# Patient Record
Sex: MALE | Race: WHITE | Employment: OTHER | URBAN - METROPOLITAN AREA
[De-identification: names, ages, dates, MRNs, and addresses within clinical notes are randomized per-mention and may not be internally consistent; named-entity substitution may affect disease eponyms.]

---

## 2017-04-04 ENCOUNTER — LAB ENCOUNTER (OUTPATIENT)
Dept: LAB | Age: 72
End: 2017-04-04
Attending: PHYSICAL MEDICINE & REHABILITATION
Payer: MEDICARE

## 2017-04-04 DIAGNOSIS — M21.372 LEFT FOOT DROP: ICD-10-CM

## 2017-04-04 DIAGNOSIS — Z79.891 ADMISSION FOR LONG-TERM OPIATE ANALGESIC USE: ICD-10-CM

## 2017-04-04 DIAGNOSIS — M48.061 SPINAL STENOSIS, LUMBAR REGION, WITHOUT NEUROGENIC CLAUDICATION: ICD-10-CM

## 2017-04-04 DIAGNOSIS — M51.37 DEGENERATION OF LUMBAR OR LUMBOSACRAL INTERVERTEBRAL DISC: ICD-10-CM

## 2017-04-04 DIAGNOSIS — Z98.890 PERSONAL HISTORY OF SURGERY TO HEART AND GREAT VESSELS, PRESENTING HAZARDS TO HEALTH: ICD-10-CM

## 2017-04-04 DIAGNOSIS — M50.30 DEGENERATION OF CERVICAL INTERVERTEBRAL DISC: ICD-10-CM

## 2017-04-04 DIAGNOSIS — M54.12 BRACHIAL NEURITIS: Primary | ICD-10-CM

## 2017-04-04 DIAGNOSIS — M21.371 RIGHT FOOT DROP: ICD-10-CM

## 2017-04-04 PROCEDURE — 80307 DRUG TEST PRSMV CHEM ANLYZR: CPT

## 2017-04-10 PROCEDURE — 80074 ACUTE HEPATITIS PANEL: CPT | Performed by: INTERNAL MEDICINE

## 2017-04-10 NOTE — PROGRESS NOTES
Quick Note:    Confirms hydrocodone and metabolite, consistant with his care.  No oxycodone detected.  ______

## 2017-05-11 PROBLEM — E11.9 DIABETES MELLITUS TYPE 2 WITHOUT RETINOPATHY (HCC): Status: ACTIVE | Noted: 2017-05-11

## 2017-05-11 PROBLEM — Z79.899 EYE EXAM DUE TO HIGH RISK MEDICATION, ENCOUNTER FOR: Status: ACTIVE | Noted: 2017-05-11

## 2017-05-11 PROBLEM — H25.813 COMBINED FORMS OF AGE-RELATED CATARACT OF BOTH EYES: Status: ACTIVE | Noted: 2017-05-11

## 2017-05-11 PROBLEM — M06.9 RHEUMATOID ARTHRITIS INVOLVING MULTIPLE SITES, UNSPECIFIED RHEUMATOID FACTOR PRESENCE: Status: ACTIVE | Noted: 2017-05-11

## 2017-06-12 PROBLEM — M54.2 CHRONIC NECK PAIN: Status: ACTIVE | Noted: 2017-06-12

## 2017-06-12 PROBLEM — G89.29 CHRONIC NECK PAIN: Status: ACTIVE | Noted: 2017-06-12

## 2017-07-03 PROBLEM — M05.79 RHEUMATOID ARTHRITIS INVOLVING MULTIPLE SITES WITH POSITIVE RHEUMATOID FACTOR (HCC): Status: ACTIVE | Noted: 2017-05-11

## 2017-10-04 PROCEDURE — 82570 ASSAY OF URINE CREATININE: CPT | Performed by: INTERNAL MEDICINE

## 2017-10-04 PROCEDURE — 82043 UR ALBUMIN QUANTITATIVE: CPT | Performed by: INTERNAL MEDICINE

## 2017-10-04 PROCEDURE — 81003 URINALYSIS AUTO W/O SCOPE: CPT | Performed by: INTERNAL MEDICINE

## 2017-10-16 PROCEDURE — 87086 URINE CULTURE/COLONY COUNT: CPT | Performed by: UROLOGY

## 2017-12-19 RX ORDER — DEXTROSE MONOHYDRATE 25 G/50ML
50 INJECTION, SOLUTION INTRAVENOUS
Status: CANCELLED | OUTPATIENT
Start: 2017-12-19

## 2017-12-19 RX ORDER — DOCUSATE SODIUM 100 MG/1
100 CAPSULE, LIQUID FILLED ORAL DAILY PRN
COMMUNITY
End: 2018-08-22 | Stop reason: ALTCHOICE

## 2017-12-21 ENCOUNTER — ANESTHESIA EVENT (OUTPATIENT)
Dept: SURGERY | Facility: HOSPITAL | Age: 72
End: 2017-12-21
Payer: MEDICARE

## 2018-01-09 ENCOUNTER — SURGERY (OUTPATIENT)
Age: 73
End: 2018-01-09

## 2018-01-09 ENCOUNTER — HOSPITAL ENCOUNTER (OUTPATIENT)
Facility: HOSPITAL | Age: 73
Setting detail: HOSPITAL OUTPATIENT SURGERY
Discharge: HOME | End: 2018-01-09
Attending: UROLOGY | Admitting: UROLOGY
Payer: MEDICARE

## 2018-01-09 ENCOUNTER — ANESTHESIA (OUTPATIENT)
Dept: SURGERY | Facility: HOSPITAL | Age: 73
End: 2018-01-09
Payer: MEDICARE

## 2018-01-09 VITALS
WEIGHT: 212.75 LBS | TEMPERATURE: 99 F | HEART RATE: 65 BPM | BODY MASS INDEX: 39.15 KG/M2 | RESPIRATION RATE: 20 BRPM | HEIGHT: 62 IN | DIASTOLIC BLOOD PRESSURE: 73 MMHG | SYSTOLIC BLOOD PRESSURE: 139 MMHG | OXYGEN SATURATION: 98 %

## 2018-01-09 DIAGNOSIS — N32.81 OVERACTIVE BLADDER: ICD-10-CM

## 2018-01-09 LAB
GLUCOSE BLD-MCNC: 102 MG/DL (ref 65–99)
GLUCOSE BLD-MCNC: 112 MG/DL (ref 65–99)

## 2018-01-09 PROCEDURE — 82962 GLUCOSE BLOOD TEST: CPT

## 2018-01-09 PROCEDURE — 3E0K8GC INTRODUCTION OF OTHER THERAPEUTIC SUBSTANCE INTO GENITOURINARY TRACT, VIA NATURAL OR ARTIFICIAL OPENING ENDOSCOPIC: ICD-10-PCS | Performed by: UROLOGY

## 2018-01-09 RX ORDER — ACETAMINOPHEN 500 MG
1000 TABLET ORAL ONCE AS NEEDED
Status: DISCONTINUED | OUTPATIENT
Start: 2018-01-09 | End: 2018-01-09

## 2018-01-09 RX ORDER — SODIUM CHLORIDE, SODIUM LACTATE, POTASSIUM CHLORIDE, CALCIUM CHLORIDE 600; 310; 30; 20 MG/100ML; MG/100ML; MG/100ML; MG/100ML
INJECTION, SOLUTION INTRAVENOUS CONTINUOUS
Status: DISCONTINUED | OUTPATIENT
Start: 2018-01-09 | End: 2018-01-09

## 2018-01-09 RX ORDER — DEXAMETHASONE SODIUM PHOSPHATE 4 MG/ML
4 VIAL (ML) INJECTION AS NEEDED
Status: DISCONTINUED | OUTPATIENT
Start: 2018-01-09 | End: 2018-01-09

## 2018-01-09 RX ORDER — HYDROMORPHONE HYDROCHLORIDE 1 MG/ML
0.4 INJECTION, SOLUTION INTRAMUSCULAR; INTRAVENOUS; SUBCUTANEOUS EVERY 5 MIN PRN
Status: DISCONTINUED | OUTPATIENT
Start: 2018-01-09 | End: 2018-01-09

## 2018-01-09 RX ORDER — LIDOCAINE HYDROCHLORIDE 20 MG/ML
JELLY TOPICAL AS NEEDED
Status: DISCONTINUED | OUTPATIENT
Start: 2018-01-09 | End: 2018-01-09

## 2018-01-09 RX ORDER — MIDAZOLAM HYDROCHLORIDE 1 MG/ML
1 INJECTION INTRAMUSCULAR; INTRAVENOUS EVERY 5 MIN PRN
Status: DISCONTINUED | OUTPATIENT
Start: 2018-01-09 | End: 2018-01-09

## 2018-01-09 RX ORDER — CEFAZOLIN SODIUM 1 G/3ML
INJECTION, POWDER, FOR SOLUTION INTRAMUSCULAR; INTRAVENOUS
Status: DISCONTINUED | OUTPATIENT
Start: 2018-01-09 | End: 2018-01-09 | Stop reason: HOSPADM

## 2018-01-09 RX ORDER — DEXTROSE MONOHYDRATE 25 G/50ML
50 INJECTION, SOLUTION INTRAVENOUS
Status: DISCONTINUED | OUTPATIENT
Start: 2018-01-09 | End: 2018-01-09

## 2018-01-09 RX ORDER — METOCLOPRAMIDE HYDROCHLORIDE 5 MG/ML
10 INJECTION INTRAMUSCULAR; INTRAVENOUS AS NEEDED
Status: DISCONTINUED | OUTPATIENT
Start: 2018-01-09 | End: 2018-01-09

## 2018-01-09 RX ORDER — ONDANSETRON 2 MG/ML
4 INJECTION INTRAMUSCULAR; INTRAVENOUS AS NEEDED
Status: DISCONTINUED | OUTPATIENT
Start: 2018-01-09 | End: 2018-01-09

## 2018-01-09 RX ORDER — LABETALOL HYDROCHLORIDE 5 MG/ML
5 INJECTION, SOLUTION INTRAVENOUS EVERY 5 MIN PRN
Status: DISCONTINUED | OUTPATIENT
Start: 2018-01-09 | End: 2018-01-09

## 2018-01-09 RX ORDER — INSULIN ASPART 100 [IU]/ML
INJECTION, SOLUTION INTRAVENOUS; SUBCUTANEOUS ONCE
Status: DISCONTINUED | OUTPATIENT
Start: 2018-01-09 | End: 2018-01-09

## 2018-01-09 RX ORDER — CEFAZOLIN SODIUM/WATER 2 G/20 ML
2 SYRINGE (ML) INTRAVENOUS ONCE
Status: DISCONTINUED | OUTPATIENT
Start: 2018-01-09 | End: 2018-01-09 | Stop reason: HOSPADM

## 2018-01-09 RX ORDER — NALOXONE HYDROCHLORIDE 0.4 MG/ML
80 INJECTION, SOLUTION INTRAMUSCULAR; INTRAVENOUS; SUBCUTANEOUS AS NEEDED
Status: DISCONTINUED | OUTPATIENT
Start: 2018-01-09 | End: 2018-01-09

## 2018-01-09 NOTE — ANESTHESIA POSTPROCEDURE EVALUATION
[facility] [facility]  Patient Status:  Hospital Outpatient Surgery   Age/Gender 67year old male MRN [id number]   Location [address] [facility] Nyla Wiliam, 1604 [facility] Day # 0 PCP Cesario Samson

## 2018-01-09 NOTE — OPERATIVE REPORT
[facility]  Date of service: 1/9/2018    Rick Shah  [id number]    PREOPERATIVE DIAGNOSIS: Refractory urinary urgency and frequency. POSTOPERATIVE DIAGNOSIS: Refractory urinary urgency and frequency.    PROCEDURE PERFORMED: Cystoscopy with i transferred to the postanesthesia care unit in stable condition with no immediately apparent complications. We will ensure that the patient is able to void adequately prior to going home. He will call us with any difficulty voiding or any signs of urinary t

## 2018-01-09 NOTE — H&P
H&P dated 12/13/17 by Dr. Ozell Goldmann was reviewed. No changes to be made. Patient seen in preoperative area. We discussed the surgical plan for today and again discussed risks, benefits, alternatives, and post operative expectations.     Patient Wilmar Otero

## 2018-01-09 NOTE — ANESTHESIA PREPROCEDURE EVALUATION
PRE-OP EVALUATION    Patient Name: Lucas Mccann.    Pre-op Diagnosis: Overactive bladder [N32.81]    Procedure(s):  CYSTOSCOPY, INTRAVESICAL BOTULINUM TOXIN INJECTION    Surgeon(s) and Role:     Liz Concepcion, DO - Primary    Pre-op vitals re known allergies. Anesthesia Evaluation    Patient summary reviewed. Anesthetic Complications           GI/Hepatic/Renal      (+) GERD                           Cardiovascular      ECG reviewed.           (+) obesity  (+) hypertension   (+) hyperlipi 11/10/2017   K 4.4 11/10/2017    11/10/2017   CO2 31.7 11/10/2017   BUN 8 11/10/2017   CREATSERUM 0.64 (L) 11/10/2017    (H) 11/10/2017            Airway      Mallampati: III       Cardiovascular    Cardiovascular exam normal.         Dental

## 2018-02-08 PROBLEM — M48.02 CERVICAL STENOSIS OF SPINAL CANAL: Status: ACTIVE | Noted: 2018-02-08

## 2018-02-15 ENCOUNTER — HOSPITAL (OUTPATIENT)
Dept: OTHER | Age: 73
End: 2018-02-15
Attending: ORTHOPAEDIC SURGERY

## 2018-02-15 LAB
ANALYZER ANC (IANC): ABNORMAL
ERYTHROCYTE [DISTWIDTH] IN BLOOD: 13.2 % (ref 11–15)
GLUCOSE BLDC GLUCOMTR-MCNC: 103 MG/DL (ref 65–99)
GLUCOSE BLDC GLUCOMTR-MCNC: 123 MG/DL (ref 65–99)
HEMATOCRIT: 40.4 % (ref 39–51)
HGB BLD-MCNC: 12.9 GM/DL (ref 13–17)
INR PPP: 1.1
MCH RBC QN AUTO: 29.1 PG (ref 26–34)
MCHC RBC AUTO-ENTMCNC: 31.9 GM/DL (ref 32–36.5)
MCV RBC AUTO: 91.2 FL (ref 78–100)
PLATELET # BLD: 197 THOUSAND/MCL (ref 140–450)
PROTHROMBIN TIME: 11.2 SECONDS (ref 9.7–11.8)
PROTHROMBIN TIME: NORMAL
RBC # BLD: 4.43 MILLION/MCL (ref 4.5–5.9)
WBC # BLD: 8.5 THOUSAND/MCL (ref 4.2–11)

## 2018-03-06 ENCOUNTER — HOSPITAL (OUTPATIENT)
Dept: OTHER | Age: 73
End: 2018-03-06
Attending: ORTHOPAEDIC SURGERY

## 2018-03-06 ENCOUNTER — CHARTING TRANS (OUTPATIENT)
Dept: OTHER | Age: 73
End: 2018-03-06

## 2018-03-06 LAB
GLUCOSE BLDC GLUCOMTR-MCNC: 104 MG/DL (ref 65–99)
GLUCOSE BLDC GLUCOMTR-MCNC: 114 MG/DL (ref 65–99)
GLUCOSE BLDC GLUCOMTR-MCNC: 172 MG/DL (ref 65–99)
GLUCOSE BLDC GLUCOMTR-MCNC: 241 MG/DL (ref 65–99)

## 2018-03-07 LAB
GLUCOSE BLDC GLUCOMTR-MCNC: 173 MG/DL (ref 65–99)
GLUCOSE BLDC GLUCOMTR-MCNC: 177 MG/DL (ref 65–99)
GLUCOSE BLDC GLUCOMTR-MCNC: 180 MG/DL (ref 65–99)

## 2018-03-12 PROBLEM — Z98.1 S/P CERVICAL SPINAL FUSION: Status: ACTIVE | Noted: 2018-03-12

## 2018-05-31 PROBLEM — R53.1 WEAKNESS: Status: ACTIVE | Noted: 2018-05-31

## 2018-08-16 PROBLEM — M19.012 GLENOHUMERAL ARTHRITIS, LEFT: Status: ACTIVE | Noted: 2018-08-16

## 2018-08-22 PROBLEM — M75.102 LEFT ROTATOR CUFF TEAR ARTHROPATHY: Status: ACTIVE | Noted: 2018-08-22

## 2018-08-22 PROBLEM — M12.812 LEFT ROTATOR CUFF TEAR ARTHROPATHY: Status: ACTIVE | Noted: 2018-08-22

## 2018-10-09 PROBLEM — J30.1 SEASONAL ALLERGIC RHINITIS DUE TO POLLEN: Status: ACTIVE | Noted: 2018-10-09

## 2018-10-09 PROCEDURE — 82570 ASSAY OF URINE CREATININE: CPT | Performed by: INTERNAL MEDICINE

## 2018-10-09 PROCEDURE — 82043 UR ALBUMIN QUANTITATIVE: CPT | Performed by: INTERNAL MEDICINE

## 2019-01-08 PROCEDURE — 87086 URINE CULTURE/COLONY COUNT: CPT | Performed by: INTERNAL MEDICINE

## 2019-01-08 PROCEDURE — 81003 URINALYSIS AUTO W/O SCOPE: CPT | Performed by: INTERNAL MEDICINE

## 2019-02-12 PROBLEM — Z79.899 MEDICAL CANNABIS USE: Status: ACTIVE | Noted: 2019-02-12

## 2019-02-15 ENCOUNTER — ANESTHESIA EVENT (OUTPATIENT)
Dept: SURGERY | Facility: HOSPITAL | Age: 74
End: 2019-02-15

## 2019-02-26 ENCOUNTER — HOSPITAL ENCOUNTER (OUTPATIENT)
Facility: HOSPITAL | Age: 74
Setting detail: HOSPITAL OUTPATIENT SURGERY
Discharge: HOME | DRG: 699 | End: 2019-02-26
Attending: UROLOGY | Admitting: UROLOGY
Payer: MEDICARE

## 2019-02-26 ENCOUNTER — ANESTHESIA (OUTPATIENT)
Dept: SURGERY | Facility: HOSPITAL | Age: 74
End: 2019-02-26

## 2019-02-26 VITALS
OXYGEN SATURATION: 95 % | RESPIRATION RATE: 14 BRPM | SYSTOLIC BLOOD PRESSURE: 121 MMHG | HEIGHT: 62 IN | BODY MASS INDEX: 39.23 KG/M2 | TEMPERATURE: 98 F | DIASTOLIC BLOOD PRESSURE: 63 MMHG | WEIGHT: 213.19 LBS | HEART RATE: 83 BPM

## 2019-02-26 DIAGNOSIS — N32.81 OAB (OVERACTIVE BLADDER): ICD-10-CM

## 2019-02-26 LAB — GLUCOSE BLD-MCNC: 92 MG/DL (ref 70–99)

## 2019-02-26 PROCEDURE — 82962 GLUCOSE BLOOD TEST: CPT

## 2019-02-26 PROCEDURE — 3E0K8GC INTRODUCTION OF OTHER THERAPEUTIC SUBSTANCE INTO GENITOURINARY TRACT, VIA NATURAL OR ARTIFICIAL OPENING ENDOSCOPIC: ICD-10-PCS | Performed by: UROLOGY

## 2019-02-26 RX ORDER — CEFAZOLIN SODIUM/WATER 2 G/20 ML
2 SYRINGE (ML) INTRAVENOUS ONCE
Status: DISCONTINUED | OUTPATIENT
Start: 2019-02-26 | End: 2019-02-26 | Stop reason: HOSPADM

## 2019-02-26 RX ORDER — ONDANSETRON 2 MG/ML
4 INJECTION INTRAMUSCULAR; INTRAVENOUS AS NEEDED
Status: DISCONTINUED | OUTPATIENT
Start: 2019-02-26 | End: 2019-02-26

## 2019-02-26 RX ORDER — DEXTROSE MONOHYDRATE 25 G/50ML
50 INJECTION, SOLUTION INTRAVENOUS
Status: DISCONTINUED | OUTPATIENT
Start: 2019-02-26 | End: 2019-02-26 | Stop reason: HOSPADM

## 2019-02-26 RX ORDER — NALOXONE HYDROCHLORIDE 0.4 MG/ML
80 INJECTION, SOLUTION INTRAMUSCULAR; INTRAVENOUS; SUBCUTANEOUS AS NEEDED
Status: DISCONTINUED | OUTPATIENT
Start: 2019-02-26 | End: 2019-02-26

## 2019-02-26 RX ORDER — PHENAZOPYRIDINE HYDROCHLORIDE 200 MG/1
200 TABLET, FILM COATED ORAL 3 TIMES DAILY PRN
Qty: 21 TABLET | Refills: 0 | Status: SHIPPED | OUTPATIENT
Start: 2019-02-26 | End: 2019-04-10

## 2019-02-26 RX ORDER — SODIUM CHLORIDE, SODIUM LACTATE, POTASSIUM CHLORIDE, CALCIUM CHLORIDE 600; 310; 30; 20 MG/100ML; MG/100ML; MG/100ML; MG/100ML
INJECTION, SOLUTION INTRAVENOUS CONTINUOUS
Status: DISCONTINUED | OUTPATIENT
Start: 2019-02-26 | End: 2019-02-26

## 2019-02-26 RX ORDER — DIPHENHYDRAMINE HYDROCHLORIDE 50 MG/ML
12.5 INJECTION INTRAMUSCULAR; INTRAVENOUS AS NEEDED
Status: DISCONTINUED | OUTPATIENT
Start: 2019-02-26 | End: 2019-02-26

## 2019-02-26 RX ORDER — LABETALOL HYDROCHLORIDE 5 MG/ML
5 INJECTION, SOLUTION INTRAVENOUS EVERY 5 MIN PRN
Status: DISCONTINUED | OUTPATIENT
Start: 2019-02-26 | End: 2019-02-26

## 2019-02-26 RX ORDER — CEFAZOLIN SODIUM/WATER 2 G/20 ML
SYRINGE (ML) INTRAVENOUS
Status: DISCONTINUED
Start: 2019-02-26 | End: 2019-02-26

## 2019-02-26 RX ORDER — METOCLOPRAMIDE HYDROCHLORIDE 5 MG/ML
10 INJECTION INTRAMUSCULAR; INTRAVENOUS AS NEEDED
Status: DISCONTINUED | OUTPATIENT
Start: 2019-02-26 | End: 2019-02-26

## 2019-02-26 RX ORDER — FLUTICASONE PROPIONATE 50 MCG
2 SPRAY, SUSPENSION (ML) NASAL DAILY
Refills: 11 | COMMUNITY
Start: 2019-01-10 | End: 2019-05-16

## 2019-02-26 RX ORDER — ACETAMINOPHEN 500 MG
1000 TABLET ORAL ONCE
Status: DISCONTINUED | OUTPATIENT
Start: 2019-02-26 | End: 2019-02-26

## 2019-02-26 RX ORDER — LIDOCAINE HYDROCHLORIDE 20 MG/ML
JELLY TOPICAL AS NEEDED
Status: DISCONTINUED | OUTPATIENT
Start: 2019-02-26 | End: 2019-02-26 | Stop reason: HOSPADM

## 2019-02-26 RX ORDER — GLIMEPIRIDE 4 MG/1
4 TABLET ORAL
COMMUNITY
End: 2019-05-10

## 2019-02-26 RX ORDER — DEXTROSE MONOHYDRATE 25 G/50ML
50 INJECTION, SOLUTION INTRAVENOUS
Status: DISCONTINUED | OUTPATIENT
Start: 2019-02-26 | End: 2019-02-26

## 2019-02-26 NOTE — ANESTHESIA PREPROCEDURE EVALUATION
PRE-OP EVALUATION    Patient Name: Anisha Banda.    Pre-op Diagnosis: OAB (overactive bladder) [N32.81]    Procedure(s):  CYSTOSCOPY BOTOX INJECTION 100UNITS    Surgeon(s) and Role:     Jenifer Salvador, DO - Primary    Pre-op vitals reviewed. [START ON 4/19/2019] morphINE Sulfate ER (MS CONTIN) 15 MG Oral Tab CR Take 1 tablet (15 mg total) by mouth every 8 (eight) hours.  Disp: 90 tablet Rfl: 0   [START ON 4/19/2019] OxyCODONE HCl 5 MG Oral Tab Take 1 tablet (5 mg total) by mouth every 8 (eight) Comment: pulmonary fibrosis                  (+) sleep apnea       Neuro/Psych                                    Past Surgical History:   Procedure Laterality Date   • ARTHROSCOPY OF JOINT UNLISTED Right 2002    RTC repair   • BACK SURGERY  2001   • CARPA TM distance: < 4 cm  Neck ROM: limited Cardiovascular    Cardiovascular exam normal.  Rhythm: regular  Rate: normal  (-) murmur   Dental             Pulmonary    Pulmonary exam normal.  Breath sounds clear to auscultation bilaterally.                Other f

## 2019-02-26 NOTE — OPERATIVE REPORT
[facility]  Date of service: 2/26/2019    Anny Bryant  [id number]    PREOPERATIVE DIAGNOSIS: OAB with refractory urinary urgency and frequency  POSTOPERATIVE DIAGNOSIS: OAB with refractory urinary urgency and frequency  PROCEDURE PERFORMED: Cy transferred to the postanesthesia care unit in stable condition with no immediately apparent complications. We will ensure that the patient is able to void adequately prior to going home. He will call us with any difficulty voiding or any signs of urinary t

## 2019-02-26 NOTE — ANESTHESIA POSTPROCEDURE EVALUATION
929 [facility] Patient Status:  Surgery Admit   Age/Gender 75year old male MRN [id number]   Location [address] [place] [address] Attending Suma Crowley, [address] [facility] Day # 0 PCP No primary care provider on file.

## 2019-02-27 LAB — GLUCOSE BLD-MCNC: 120 MG/DL (ref 70–99)

## 2019-04-26 ENCOUNTER — ANESTHESIA EVENT (OUTPATIENT)
Dept: SURGERY | Facility: HOSPITAL | Age: 74
DRG: 483 | End: 2019-04-26
Payer: MEDICARE

## 2019-04-26 ENCOUNTER — ANESTHESIA (OUTPATIENT)
Dept: SURGERY | Facility: HOSPITAL | Age: 74
DRG: 483 | End: 2019-04-26
Payer: MEDICARE

## 2019-04-26 ENCOUNTER — APPOINTMENT (OUTPATIENT)
Dept: GENERAL RADIOLOGY | Facility: HOSPITAL | Age: 74
DRG: 483 | End: 2019-04-26
Attending: ORTHOPAEDIC SURGERY
Payer: MEDICARE

## 2019-04-26 ENCOUNTER — HOSPITAL ENCOUNTER (INPATIENT)
Facility: HOSPITAL | Age: 74
LOS: 1 days | Discharge: HOME HEALTH | DRG: 483 | End: 2019-04-27
Attending: ORTHOPAEDIC SURGERY | Admitting: ORTHOPAEDIC SURGERY
Payer: MEDICARE

## 2019-04-26 DIAGNOSIS — M05.79 SEROPOSITIVE RHEUMATOID ARTHRITIS OF MULTIPLE SITES (HCC): ICD-10-CM

## 2019-04-26 DIAGNOSIS — M75.102 ROTATOR CUFF SYNDROME OF LEFT SHOULDER: ICD-10-CM

## 2019-04-26 PROCEDURE — 3E0T3BZ INTRODUCTION OF ANESTHETIC AGENT INTO PERIPHERAL NERVES AND PLEXI, PERCUTANEOUS APPROACH: ICD-10-PCS | Performed by: ANESTHESIOLOGY

## 2019-04-26 PROCEDURE — 88305 TISSUE EXAM BY PATHOLOGIST: CPT | Performed by: ORTHOPAEDIC SURGERY

## 2019-04-26 PROCEDURE — 82962 GLUCOSE BLOOD TEST: CPT

## 2019-04-26 PROCEDURE — 88311 DECALCIFY TISSUE: CPT | Performed by: ORTHOPAEDIC SURGERY

## 2019-04-26 PROCEDURE — 0RRK00Z REPLACEMENT OF LEFT SHOULDER JOINT WITH REVERSE BALL AND SOCKET SYNTHETIC SUBSTITUTE, OPEN APPROACH: ICD-10-PCS | Performed by: ORTHOPAEDIC SURGERY

## 2019-04-26 PROCEDURE — 64415 NJX AA&/STRD BRCH PLXS IMG: CPT | Performed by: ORTHOPAEDIC SURGERY

## 2019-04-26 PROCEDURE — 73030 X-RAY EXAM OF SHOULDER: CPT | Performed by: ORTHOPAEDIC SURGERY

## 2019-04-26 PROCEDURE — 76942 ECHO GUIDE FOR BIOPSY: CPT | Performed by: ORTHOPAEDIC SURGERY

## 2019-04-26 PROCEDURE — 99152 MOD SED SAME PHYS/QHP 5/>YRS: CPT | Performed by: ORTHOPAEDIC SURGERY

## 2019-04-26 DEVICE — IMPLANTABLE DEVICE
Type: IMPLANTABLE DEVICE | Site: SHOULDER | Status: FUNCTIONAL
Brand: AEQUALIS™ ASCEND™ FLEX

## 2019-04-26 DEVICE — IMPLANTABLE DEVICE
Type: IMPLANTABLE DEVICE | Site: SHOULDER | Status: FUNCTIONAL
Brand: FLEX SHOULDER SYSTEM

## 2019-04-26 RX ORDER — FLUTICASONE PROPIONATE 50 MCG
2 SPRAY, SUSPENSION (ML) NASAL DAILY
Status: DISCONTINUED | OUTPATIENT
Start: 2019-04-26 | End: 2019-04-27

## 2019-04-26 RX ORDER — HYDROCODONE BITARTRATE AND ACETAMINOPHEN 10; 325 MG/1; MG/1
1-2 TABLET ORAL EVERY 6 HOURS PRN
Qty: 40 TABLET | Refills: 0 | Status: SHIPPED | OUTPATIENT
Start: 2019-04-26 | End: 2019-04-27

## 2019-04-26 RX ORDER — DEXTROSE MONOHYDRATE 25 G/50ML
50 INJECTION, SOLUTION INTRAVENOUS
Status: DISCONTINUED | OUTPATIENT
Start: 2019-04-26 | End: 2019-04-26 | Stop reason: HOSPADM

## 2019-04-26 RX ORDER — LIDOCAINE HYDROCHLORIDE 10 MG/ML
INJECTION, SOLUTION INFILTRATION; PERINEURAL
Status: COMPLETED | OUTPATIENT
Start: 2019-04-26 | End: 2019-04-26

## 2019-04-26 RX ORDER — SODIUM CHLORIDE 0.9 % (FLUSH) 0.9 %
10 SYRINGE (ML) INJECTION AS NEEDED
Status: DISCONTINUED | OUTPATIENT
Start: 2019-04-26 | End: 2019-04-27

## 2019-04-26 RX ORDER — CLINDAMYCIN PHOSPHATE 900 MG/50ML
900 INJECTION INTRAVENOUS ONCE
Status: COMPLETED | OUTPATIENT
Start: 2019-04-26 | End: 2019-04-26

## 2019-04-26 RX ORDER — MORPHINE SULFATE 2 MG/ML
2 INJECTION, SOLUTION INTRAMUSCULAR; INTRAVENOUS EVERY 2 HOUR PRN
Status: DISCONTINUED | OUTPATIENT
Start: 2019-04-27 | End: 2019-04-27

## 2019-04-26 RX ORDER — CEFAZOLIN SODIUM/WATER 2 G/20 ML
2 SYRINGE (ML) INTRAVENOUS EVERY 8 HOURS
Status: COMPLETED | OUTPATIENT
Start: 2019-04-26 | End: 2019-04-26

## 2019-04-26 RX ORDER — MORPHINE SULFATE 4 MG/ML
4 INJECTION, SOLUTION INTRAMUSCULAR; INTRAVENOUS EVERY 10 MIN PRN
Status: DISCONTINUED | OUTPATIENT
Start: 2019-04-26 | End: 2019-04-26 | Stop reason: HOSPADM

## 2019-04-26 RX ORDER — MORPHINE SULFATE 4 MG/ML
4 INJECTION, SOLUTION INTRAMUSCULAR; INTRAVENOUS EVERY 2 HOUR PRN
Status: DISCONTINUED | OUTPATIENT
Start: 2019-04-27 | End: 2019-04-27

## 2019-04-26 RX ORDER — MORPHINE SULFATE 2 MG/ML
2 INJECTION, SOLUTION INTRAMUSCULAR; INTRAVENOUS EVERY 10 MIN PRN
Status: DISCONTINUED | OUTPATIENT
Start: 2019-04-26 | End: 2019-04-26 | Stop reason: HOSPADM

## 2019-04-26 RX ORDER — BISACODYL 10 MG
10 SUPPOSITORY, RECTAL RECTAL
Status: DISCONTINUED | OUTPATIENT
Start: 2019-04-26 | End: 2019-04-27

## 2019-04-26 RX ORDER — ONDANSETRON 2 MG/ML
4 INJECTION INTRAMUSCULAR; INTRAVENOUS ONCE AS NEEDED
Status: DISCONTINUED | OUTPATIENT
Start: 2019-04-26 | End: 2019-04-26 | Stop reason: HOSPADM

## 2019-04-26 RX ORDER — HYDROCODONE BITARTRATE AND ACETAMINOPHEN 10; 325 MG/1; MG/1
2 TABLET ORAL EVERY 6 HOURS PRN
Status: DISCONTINUED | OUTPATIENT
Start: 2019-04-27 | End: 2019-04-27

## 2019-04-26 RX ORDER — OXYCODONE HCL 10 MG/1
10 TABLET, FILM COATED, EXTENDED RELEASE ORAL EVERY 12 HOURS
Status: DISCONTINUED | OUTPATIENT
Start: 2019-04-26 | End: 2019-04-27

## 2019-04-26 RX ORDER — MORPHINE SULFATE 4 MG/ML
6 INJECTION, SOLUTION INTRAMUSCULAR; INTRAVENOUS EVERY 2 HOUR PRN
Status: DISCONTINUED | OUTPATIENT
Start: 2019-04-26 | End: 2019-04-27

## 2019-04-26 RX ORDER — HYDROCODONE BITARTRATE AND ACETAMINOPHEN 10; 325 MG/1; MG/1
1 TABLET ORAL EVERY 6 HOURS PRN
Status: DISCONTINUED | OUTPATIENT
Start: 2019-04-27 | End: 2019-04-27

## 2019-04-26 RX ORDER — SENNOSIDES 8.6 MG
17.2 TABLET ORAL NIGHTLY
Status: DISCONTINUED | OUTPATIENT
Start: 2019-04-26 | End: 2019-04-27

## 2019-04-26 RX ORDER — ATORVASTATIN CALCIUM 10 MG/1
10 TABLET, FILM COATED ORAL NIGHTLY
Status: DISCONTINUED | OUTPATIENT
Start: 2019-04-26 | End: 2019-04-27

## 2019-04-26 RX ORDER — POLYETHYLENE GLYCOL 3350 17 G/17G
17 POWDER, FOR SOLUTION ORAL DAILY PRN
Status: DISCONTINUED | OUTPATIENT
Start: 2019-04-26 | End: 2019-04-27

## 2019-04-26 RX ORDER — MORPHINE SULFATE 10 MG/ML
6 INJECTION, SOLUTION INTRAMUSCULAR; INTRAVENOUS EVERY 10 MIN PRN
Status: DISCONTINUED | OUTPATIENT
Start: 2019-04-26 | End: 2019-04-26 | Stop reason: HOSPADM

## 2019-04-26 RX ORDER — OXYCODONE HYDROCHLORIDE 5 MG/1
10 TABLET ORAL EVERY 4 HOURS PRN
Status: DISCONTINUED | OUTPATIENT
Start: 2019-04-26 | End: 2019-04-27

## 2019-04-26 RX ORDER — NEOSTIGMINE METHYLSULFATE 0.5 MG/ML
INJECTION INTRAVENOUS AS NEEDED
Status: DISCONTINUED | OUTPATIENT
Start: 2019-04-26 | End: 2019-04-26 | Stop reason: SURG

## 2019-04-26 RX ORDER — NALOXONE HYDROCHLORIDE 0.4 MG/ML
80 INJECTION, SOLUTION INTRAMUSCULAR; INTRAVENOUS; SUBCUTANEOUS AS NEEDED
Status: DISCONTINUED | OUTPATIENT
Start: 2019-04-26 | End: 2019-04-26 | Stop reason: HOSPADM

## 2019-04-26 RX ORDER — ROCURONIUM BROMIDE 10 MG/ML
INJECTION, SOLUTION INTRAVENOUS AS NEEDED
Status: DISCONTINUED | OUTPATIENT
Start: 2019-04-26 | End: 2019-04-26 | Stop reason: SURG

## 2019-04-26 RX ORDER — SODIUM CHLORIDE, SODIUM LACTATE, POTASSIUM CHLORIDE, CALCIUM CHLORIDE 600; 310; 30; 20 MG/100ML; MG/100ML; MG/100ML; MG/100ML
INJECTION, SOLUTION INTRAVENOUS CONTINUOUS
Status: DISCONTINUED | OUTPATIENT
Start: 2019-04-26 | End: 2019-04-27

## 2019-04-26 RX ORDER — HYDROCODONE BITARTRATE AND ACETAMINOPHEN 5; 325 MG/1; MG/1
1 TABLET ORAL AS NEEDED
Status: DISCONTINUED | OUTPATIENT
Start: 2019-04-26 | End: 2019-04-26 | Stop reason: HOSPADM

## 2019-04-26 RX ORDER — PHENYLEPHRINE HCL 10 MG/ML
VIAL (ML) INJECTION AS NEEDED
Status: DISCONTINUED | OUTPATIENT
Start: 2019-04-26 | End: 2019-04-26 | Stop reason: SURG

## 2019-04-26 RX ORDER — OXYCODONE HYDROCHLORIDE 5 MG/1
5 TABLET ORAL EVERY 4 HOURS PRN
Status: DISCONTINUED | OUTPATIENT
Start: 2019-04-26 | End: 2019-04-27

## 2019-04-26 RX ORDER — DEXTROSE MONOHYDRATE 25 G/50ML
50 INJECTION, SOLUTION INTRAVENOUS AS NEEDED
Status: DISCONTINUED | OUTPATIENT
Start: 2019-04-26 | End: 2019-04-27

## 2019-04-26 RX ORDER — MORPHINE SULFATE 15 MG/1
15 TABLET, FILM COATED, EXTENDED RELEASE ORAL EVERY 8 HOURS SCHEDULED
Status: DISCONTINUED | OUTPATIENT
Start: 2019-04-26 | End: 2019-04-27

## 2019-04-26 RX ORDER — NALOXONE HYDROCHLORIDE 0.4 MG/ML
0.4 INJECTION, SOLUTION INTRAMUSCULAR; INTRAVENOUS; SUBCUTANEOUS AS NEEDED
Status: DISCONTINUED | OUTPATIENT
Start: 2019-04-26 | End: 2019-04-27

## 2019-04-26 RX ORDER — ONDANSETRON 2 MG/ML
INJECTION INTRAMUSCULAR; INTRAVENOUS AS NEEDED
Status: DISCONTINUED | OUTPATIENT
Start: 2019-04-26 | End: 2019-04-26 | Stop reason: SURG

## 2019-04-26 RX ORDER — SODIUM CHLORIDE, SODIUM LACTATE, POTASSIUM CHLORIDE, CALCIUM CHLORIDE 600; 310; 30; 20 MG/100ML; MG/100ML; MG/100ML; MG/100ML
INJECTION, SOLUTION INTRAVENOUS CONTINUOUS
Status: DISCONTINUED | OUTPATIENT
Start: 2019-04-26 | End: 2019-04-26 | Stop reason: HOSPADM

## 2019-04-26 RX ORDER — MIDAZOLAM HYDROCHLORIDE 1 MG/ML
INJECTION INTRAMUSCULAR; INTRAVENOUS
Status: COMPLETED | OUTPATIENT
Start: 2019-04-26 | End: 2019-04-26

## 2019-04-26 RX ORDER — DEXAMETHASONE SODIUM PHOSPHATE 10 MG/ML
INJECTION, SOLUTION INTRAMUSCULAR; INTRAVENOUS
Status: COMPLETED | OUTPATIENT
Start: 2019-04-26 | End: 2019-04-26

## 2019-04-26 RX ORDER — HYDROCODONE BITARTRATE AND ACETAMINOPHEN 10; 325 MG/1; MG/1
1-2 TABLET ORAL EVERY 6 HOURS PRN
Qty: 40 TABLET | Refills: 0 | Status: SHIPPED | OUTPATIENT
Start: 2019-04-26 | End: 2019-04-26

## 2019-04-26 RX ORDER — ENOXAPARIN SODIUM 100 MG/ML
40 INJECTION SUBCUTANEOUS NIGHTLY
Status: DISCONTINUED | OUTPATIENT
Start: 2019-04-26 | End: 2019-04-27

## 2019-04-26 RX ORDER — ONDANSETRON 2 MG/ML
4 INJECTION INTRAMUSCULAR; INTRAVENOUS EVERY 4 HOURS PRN
Status: DISCONTINUED | OUTPATIENT
Start: 2019-04-26 | End: 2019-04-27

## 2019-04-26 RX ORDER — METOCLOPRAMIDE HYDROCHLORIDE 5 MG/ML
10 INJECTION INTRAMUSCULAR; INTRAVENOUS EVERY 6 HOURS PRN
Status: DISCONTINUED | OUTPATIENT
Start: 2019-04-26 | End: 2019-04-27

## 2019-04-26 RX ORDER — ACETAMINOPHEN 500 MG
1000 TABLET ORAL EVERY 8 HOURS
Status: COMPLETED | OUTPATIENT
Start: 2019-04-26 | End: 2019-04-27

## 2019-04-26 RX ORDER — METOCLOPRAMIDE 10 MG/1
10 TABLET ORAL ONCE
Status: COMPLETED | OUTPATIENT
Start: 2019-04-26 | End: 2019-04-26

## 2019-04-26 RX ORDER — MORPHINE SULFATE 4 MG/ML
4 INJECTION, SOLUTION INTRAMUSCULAR; INTRAVENOUS EVERY 2 HOUR PRN
Status: DISCONTINUED | OUTPATIENT
Start: 2019-04-26 | End: 2019-04-27

## 2019-04-26 RX ORDER — HALOPERIDOL 5 MG/ML
0.25 INJECTION INTRAMUSCULAR ONCE AS NEEDED
Status: DISCONTINUED | OUTPATIENT
Start: 2019-04-26 | End: 2019-04-26 | Stop reason: HOSPADM

## 2019-04-26 RX ORDER — SODIUM CHLORIDE, SODIUM LACTATE, POTASSIUM CHLORIDE, CALCIUM CHLORIDE 600; 310; 30; 20 MG/100ML; MG/100ML; MG/100ML; MG/100ML
INJECTION, SOLUTION INTRAVENOUS CONTINUOUS
Status: DISCONTINUED | OUTPATIENT
Start: 2019-04-26 | End: 2019-04-26 | Stop reason: ALTCHOICE

## 2019-04-26 RX ORDER — DOCUSATE SODIUM 100 MG/1
100 CAPSULE, LIQUID FILLED ORAL 2 TIMES DAILY
Status: DISCONTINUED | OUTPATIENT
Start: 2019-04-26 | End: 2019-04-27

## 2019-04-26 RX ORDER — SODIUM PHOSPHATE, DIBASIC AND SODIUM PHOSPHATE, MONOBASIC 7; 19 G/133ML; G/133ML
1 ENEMA RECTAL ONCE AS NEEDED
Status: DISCONTINUED | OUTPATIENT
Start: 2019-04-26 | End: 2019-04-27

## 2019-04-26 RX ORDER — GLYCOPYRROLATE 0.2 MG/ML
INJECTION INTRAMUSCULAR; INTRAVENOUS AS NEEDED
Status: DISCONTINUED | OUTPATIENT
Start: 2019-04-26 | End: 2019-04-26 | Stop reason: SURG

## 2019-04-26 RX ORDER — MORPHINE SULFATE 2 MG/ML
1 INJECTION, SOLUTION INTRAMUSCULAR; INTRAVENOUS EVERY 2 HOUR PRN
Status: DISCONTINUED | OUTPATIENT
Start: 2019-04-27 | End: 2019-04-27

## 2019-04-26 RX ORDER — ROPIVACAINE HYDROCHLORIDE 5 MG/ML
INJECTION, SOLUTION EPIDURAL; INFILTRATION; PERINEURAL
Status: COMPLETED | OUTPATIENT
Start: 2019-04-26 | End: 2019-04-26

## 2019-04-26 RX ORDER — MORPHINE SULFATE 2 MG/ML
2 INJECTION, SOLUTION INTRAMUSCULAR; INTRAVENOUS EVERY 2 HOUR PRN
Status: DISCONTINUED | OUTPATIENT
Start: 2019-04-26 | End: 2019-04-27

## 2019-04-26 RX ORDER — FAMOTIDINE 20 MG/1
20 TABLET ORAL ONCE
Status: COMPLETED | OUTPATIENT
Start: 2019-04-26 | End: 2019-04-26

## 2019-04-26 RX ORDER — LIDOCAINE HYDROCHLORIDE 10 MG/ML
INJECTION, SOLUTION EPIDURAL; INFILTRATION; INTRACAUDAL; PERINEURAL AS NEEDED
Status: DISCONTINUED | OUTPATIENT
Start: 2019-04-26 | End: 2019-04-26 | Stop reason: SURG

## 2019-04-26 RX ORDER — ACETAMINOPHEN 500 MG
1000 TABLET ORAL ONCE
Status: COMPLETED | OUTPATIENT
Start: 2019-04-26 | End: 2019-04-26

## 2019-04-26 RX ORDER — DIPHENHYDRAMINE HYDROCHLORIDE 50 MG/ML
25 INJECTION INTRAMUSCULAR; INTRAVENOUS ONCE AS NEEDED
Status: ACTIVE | OUTPATIENT
Start: 2019-04-26 | End: 2019-04-26

## 2019-04-26 RX ORDER — MORPHINE SULFATE 15 MG/1
15 TABLET ORAL EVERY 4 HOURS PRN
Status: DISCONTINUED | OUTPATIENT
Start: 2019-04-26 | End: 2019-04-27

## 2019-04-26 RX ORDER — HYDROCODONE BITARTRATE AND ACETAMINOPHEN 5; 325 MG/1; MG/1
2 TABLET ORAL AS NEEDED
Status: DISCONTINUED | OUTPATIENT
Start: 2019-04-26 | End: 2019-04-26 | Stop reason: HOSPADM

## 2019-04-26 RX ADMIN — PHENYLEPHRINE HCL 100 MCG: 10 MG/ML VIAL (ML) INJECTION at 09:44:00

## 2019-04-26 RX ADMIN — DEXAMETHASONE SODIUM PHOSPHATE 5 MG: 10 INJECTION, SOLUTION INTRAMUSCULAR; INTRAVENOUS at 07:26:00

## 2019-04-26 RX ADMIN — LIDOCAINE HYDROCHLORIDE 20 MG: 10 INJECTION, SOLUTION EPIDURAL; INFILTRATION; INTRACAUDAL; PERINEURAL at 07:43:00

## 2019-04-26 RX ADMIN — MIDAZOLAM HYDROCHLORIDE 1 MG: 1 INJECTION INTRAMUSCULAR; INTRAVENOUS at 07:26:00

## 2019-04-26 RX ADMIN — SODIUM CHLORIDE, SODIUM LACTATE, POTASSIUM CHLORIDE, CALCIUM CHLORIDE: 600; 310; 30; 20 INJECTION, SOLUTION INTRAVENOUS at 09:41:00

## 2019-04-26 RX ADMIN — ROPIVACAINE HYDROCHLORIDE 30 ML: 5 INJECTION, SOLUTION EPIDURAL; INFILTRATION; PERINEURAL at 07:26:00

## 2019-04-26 RX ADMIN — PHENYLEPHRINE HCL 100 MCG: 10 MG/ML VIAL (ML) INJECTION at 09:00:00

## 2019-04-26 RX ADMIN — SODIUM CHLORIDE, SODIUM LACTATE, POTASSIUM CHLORIDE, CALCIUM CHLORIDE: 600; 310; 30; 20 INJECTION, SOLUTION INTRAVENOUS at 10:35:00

## 2019-04-26 RX ADMIN — PHENYLEPHRINE HCL 100 MCG: 10 MG/ML VIAL (ML) INJECTION at 09:28:00

## 2019-04-26 RX ADMIN — PHENYLEPHRINE HCL 100 MCG: 10 MG/ML VIAL (ML) INJECTION at 08:23:00

## 2019-04-26 RX ADMIN — ONDANSETRON 4 MG: 2 INJECTION INTRAMUSCULAR; INTRAVENOUS at 09:46:00

## 2019-04-26 RX ADMIN — PHENYLEPHRINE HCL 100 MCG: 10 MG/ML VIAL (ML) INJECTION at 09:54:00

## 2019-04-26 RX ADMIN — ROCURONIUM BROMIDE 25 MG: 10 INJECTION, SOLUTION INTRAVENOUS at 08:15:00

## 2019-04-26 RX ADMIN — PHENYLEPHRINE HCL 100 MCG: 10 MG/ML VIAL (ML) INJECTION at 09:25:00

## 2019-04-26 RX ADMIN — PHENYLEPHRINE HCL 100 MCG: 10 MG/ML VIAL (ML) INJECTION at 08:41:00

## 2019-04-26 RX ADMIN — SODIUM CHLORIDE, SODIUM LACTATE, POTASSIUM CHLORIDE, CALCIUM CHLORIDE: 600; 310; 30; 20 INJECTION, SOLUTION INTRAVENOUS at 09:38:00

## 2019-04-26 RX ADMIN — ROCURONIUM BROMIDE 10 MG: 10 INJECTION, SOLUTION INTRAVENOUS at 09:15:00

## 2019-04-26 RX ADMIN — SODIUM CHLORIDE, SODIUM LACTATE, POTASSIUM CHLORIDE, CALCIUM CHLORIDE: 600; 310; 30; 20 INJECTION, SOLUTION INTRAVENOUS at 10:27:00

## 2019-04-26 RX ADMIN — ROCURONIUM BROMIDE 5 MG: 10 INJECTION, SOLUTION INTRAVENOUS at 07:43:00

## 2019-04-26 RX ADMIN — PHENYLEPHRINE HCL 100 MCG: 10 MG/ML VIAL (ML) INJECTION at 09:48:00

## 2019-04-26 RX ADMIN — PHENYLEPHRINE HCL 100 MCG: 10 MG/ML VIAL (ML) INJECTION at 08:07:00

## 2019-04-26 RX ADMIN — PHENYLEPHRINE HCL 100 MCG: 10 MG/ML VIAL (ML) INJECTION at 08:35:00

## 2019-04-26 RX ADMIN — LIDOCAINE HYDROCHLORIDE 1 ML: 10 INJECTION, SOLUTION INFILTRATION; PERINEURAL at 07:26:00

## 2019-04-26 RX ADMIN — PHENYLEPHRINE HCL 100 MCG: 10 MG/ML VIAL (ML) INJECTION at 07:57:00

## 2019-04-26 RX ADMIN — CLINDAMYCIN PHOSPHATE 900 MG: 900 INJECTION INTRAVENOUS at 07:53:00

## 2019-04-26 RX ADMIN — PHENYLEPHRINE HCL 100 MCG: 10 MG/ML VIAL (ML) INJECTION at 08:00:00

## 2019-04-26 RX ADMIN — PHENYLEPHRINE HCL 100 MCG: 10 MG/ML VIAL (ML) INJECTION at 08:53:00

## 2019-04-26 RX ADMIN — PHENYLEPHRINE HCL 100 MCG: 10 MG/ML VIAL (ML) INJECTION at 08:46:00

## 2019-04-26 RX ADMIN — GLYCOPYRROLATE 0.1 MG: 0.2 INJECTION INTRAMUSCULAR; INTRAVENOUS at 07:43:00

## 2019-04-26 RX ADMIN — PHENYLEPHRINE HCL 100 MCG: 10 MG/ML VIAL (ML) INJECTION at 08:11:00

## 2019-04-26 RX ADMIN — SODIUM CHLORIDE, SODIUM LACTATE, POTASSIUM CHLORIDE, CALCIUM CHLORIDE: 600; 310; 30; 20 INJECTION, SOLUTION INTRAVENOUS at 07:33:00

## 2019-04-26 RX ADMIN — PHENYLEPHRINE HCL 100 MCG: 10 MG/ML VIAL (ML) INJECTION at 08:27:00

## 2019-04-26 RX ADMIN — PHENYLEPHRINE HCL 100 MCG: 10 MG/ML VIAL (ML) INJECTION at 08:13:00

## 2019-04-26 RX ADMIN — GLYCOPYRROLATE 0.6 MG: 0.2 INJECTION INTRAMUSCULAR; INTRAVENOUS at 09:58:00

## 2019-04-26 RX ADMIN — NEOSTIGMINE METHYLSULFATE 5 MG: 0.5 INJECTION INTRAVENOUS at 09:58:00

## 2019-04-26 RX ADMIN — PHENYLEPHRINE HCL 100 MCG: 10 MG/ML VIAL (ML) INJECTION at 08:03:00

## 2019-04-26 NOTE — H&P
[facility] Hospitalist H&P     CC: No chief complaint on file. PCP: No primary care provider on file.     Admission Date: 4/26/2019    ASSESSMENT / PLAN:   Mr. Ema Ruggiero. is a 75year old male with PMH of anxiety, cervical radiculopathy, depression, DM, HT complication     respiratory distress with prostate surgery   • Anxiety state    • Bilateral leg edema 12/6/2016   • Cervical radiculopathy at C6 4/9/2013   • Depression    • Diabetes ([facility] [address] 2002   • High cholesterol    • HYPERLIPIDEMIA    • HYPERTENSION SURGERY Bilateral 2006   • Elease Gentle  5-14    Done by Dr. Matilde Lopez after increased weakness after steroid injection by Dr. Jose Daniel Polanco   • OTHER  2014    prostate vaporization   • OTHER  1997    right ulnar nerve transposition   • OTHER  1992 Disp: 60 tablet Rfl: 11   SITagliptin Phosphate (JANUVIA) 100 MG Oral Tab Take 1 tablet (100 mg total) by mouth daily. Disp: 90 tablet Rfl: 3   POLYETHYLENE GLYCOL 3350 OR 17 g every morning.    Disp:  Rfl:    Cholecalciferol (VITAMIN D3) 2000 UNIT Oral Tab 4/26/2019  CONCLUSION: * Left reversed total shoulder arthroplasty is identified. * The prosthesis is well seated in the humerus and well directed towards the prosthetic glenoid. * No obvious complication. * Heart is enlarged.  * There is some consolidation

## 2019-04-26 NOTE — ANESTHESIA PREPROCEDURE EVALUATION
Anesthesia PreOp Note    HPI:     Skippy Arturo. is a 75year old male who presents for preoperative consultation requested by: Caridad Reid MD    Date of Surgery: 4/26/2019    Procedure(s):  SHOULDER TOTAL  Indication: Seropositive rheumatoid a [facility])         Date Noted: 04/26/2016      Overactive detrusor         Date Noted: 11/18/2015      OAB (overactive bladder)         Date Noted: 06/15/2015      Stenosis, spinal, lumbar         Date Noted: 01/12/2015      Leg weakness, bilateral         Date 11/3/2016   • Neuropathy     bilateral legs   • OBESITY    • OSTEOARTHRITIS    • Osteoarthritis    • OSTEOPOROSIS    • OTHER DISEASES     Interstitial lung disease   • OTHER DISEASES     Overactive bladder   • OTHER DISEASES 2010    Scoliosis of spine with injection- Dr. Gregorio Public          Medications Prior to Admission:  mupirocin (BACTROBAN) 2 % External Ointment 2 (two) times daily. Disp:  Rfl:  4/25/2019 at 1830   Calcium-Magnesium-Zinc 333..33-5 MG Oral Tab Take 1 tablet by mouth daily.  Disp:  Rfl with breakfast.   Disp:  Rfl:  4/22/2019       Current Facility-Administered Medications Ordered in Epic:  lactated ringers infusion  Intravenous Continuous Gaston Servin MD Last Rate: 20 mL/hr at 04/26/19 0641   clindamycin (CLEOCIN) in D5W 900mg/50ml meetings of clubs or organizations: Not on file        Relationship status: Not on file      Intimate partner violence:        Fear of current or ex partner: Not on file        Emotionally abused: Not on file        Physically abused: Not on file        Fo Video laryngoscope and ETT  Post-op Pain Management: IV analgesics, Interscalene block and Local  Informed Consent Plan and Risks Discussed With:  Patient and spouse  Discussed plan with:  CRNA and surgeon      I have informed Calderon Romero and/or sluy

## 2019-04-26 NOTE — H&P
Melvin Steward.  1/22/1945  75year old   male  Emeka Armstrong MD     HPI:   Patient presents with:  Shoulder Pain: Left Shoulder F/U Arthrogram Results.        The patient complains of pain located left shoulder. The pain is the same.   He has pain Phosphate (JANUVIA) 100 MG Oral Tab Take 1 tablet (100 mg total) by mouth daily.  Disp: 90 tablet Rfl: 3   glimepiride 4 MG Oral Tab Take 1 tablet (4 mg total) by mouth every morning before breakfast. Disp: 90 tablet Rfl: 3   POLYETHYLENE GLYCOL 3350 OR 17 EPIDURAL INJECTION N/A 5/1/2017     Performed by Alanis Farmer DO at [address] [place] [address]   • COLONOSCOPY       • CYSTOSCOPY N/A 12/8/2015     Performed by Steven Grossman DO at [facility] MAIN OR   • CYSTOSCOPY WITH BOTOX INJECTION N/A 1/9/2018     Pe patient has 5/5 strength in finger flexion, finger extension, EPL, FPL, and interosseous muscle function. The patient has left shoulder range of motion of 90 degrees of elevation, 30 degrees of external rotation, and internal rotation to the L5.   The sweta

## 2019-04-26 NOTE — PLAN OF CARE
Problem: Diabetes/Glucose Control  Goal: Glucose maintained within prescribed range  Description  INTERVENTIONS:  - Monitor Blood Glucose as ordered  - Assess for signs and symptoms of hyperglycemia and hypoglycemia  - Administer ordered medications to m relaxation techniques  - Monitor for opioid side effects  - Notify MD/LIP if interventions unsuccessful or patient reports new pain  - Anticipate increased pain with activity and pre-medicate as appropriate  Outcome: Progressing     Problem: SAFETY ADULT - and no cpap. Up with 2 assist. Unsteady gait.    Plan: home when cleared by PT/OT

## 2019-04-26 NOTE — OR NURSING
Dr. Debby Lehman notified of bleeding L shoulder site, no drainage in drain. Will continue to monitor and reinforce site.

## 2019-04-26 NOTE — ANESTHESIA PROCEDURE NOTES
Airway  Date/Time: 4/26/2019 7:46 AM  Urgency: elective    Airway not difficult    General Information and Staff    Patient location during procedure: OR  Anesthesiologist: Sundar Zuleta MD  Resident/CRNA: Enma Freeman CRNA  Performed: CRNA

## 2019-04-26 NOTE — ANESTHESIA PROCEDURE NOTES
Peripheral Block  Date/Time: 4/26/2019 7:26 AM    Anesthesiologist:  Zuleima Lomax MD  Performed by:   Anesthesiologist  Patient Location:  PACU  Start Time:  4/26/2019 7:16 AM  End Time:  4/26/2019 7:23 AM  Site Identification: ultrasound guided, real

## 2019-04-26 NOTE — BRIEF OP NOTE
Pre-Operative Diagnosis: Seropositive rheumatoid arthritis of multiple sites [facility]) [M05.79]  Rotator cuff syndrome of left shoulder [M75.102]     Post-Operative Diagnosis: Seropositive rheumatoid arthritis of multiple sites [facility]) [M05.79]Rotator cuff syndr

## 2019-04-26 NOTE — ANESTHESIA POSTPROCEDURE EVALUATION
Patient: Kit Gunn.     Procedure Summary     Date:  04/26/19 Room / Location:  [address] [place] [address] OR 04 / [address] [place] [address] OR    Anesthesia Start:  0142 Anesthesia Stop:  0004    Procedure:  SHOULDER TOTAL (Left Shoulder) Diagnosis:       Seropositive rheumatoid a

## 2019-04-26 NOTE — OPERATIVE REPORT
[facility]    PATIENT'S NAME: Samantha Hollis JR   ATTENDING PHYSICIAN: Anitra Torres MD   OPERATING PHYSICIAN: Anitra Torres MD   PATIENT ACCOUNT#:   [id number]    LOCATION:  [facility] [address] PACU [facility] 10  MEDICAL RECORD #:   [id number] seen and evaluated preoperatively. His left shoulder was identified as the correct operative site. My initials were placed. He was transferred to the operating room and transferred to the operating table in a supine position.   General anesthesia was ind then reamed. Secondary to significant  superior wear, a large wedge Perform Tornier baseplate was chosen. It was compressed into place and secured with superior and inferior locking screws and anterior and posterior compression screws.   The wound was irr

## 2019-04-26 NOTE — CM/SW NOTE
PREETI met with the pt. At bedside. The pt. Lives with his wife in a ranch home with 4 steps to enter. The pt. Reports being independent prior to admission with adls and ambulation with a rolator walker. The pt.  Also has home O2 through [employer] and uses it on

## 2019-04-26 NOTE — OR NURSING
Dr. Tu Lee at bedside aware that there is not drainage in drain and old bloody drainage on drsg. She stated she will reassess after next OR case is completed.  Will continue to monitor site and notify her with changes

## 2019-04-27 VITALS
DIASTOLIC BLOOD PRESSURE: 58 MMHG | TEMPERATURE: 98 F | OXYGEN SATURATION: 92 % | RESPIRATION RATE: 18 BRPM | SYSTOLIC BLOOD PRESSURE: 126 MMHG | HEIGHT: 62 IN | HEART RATE: 88 BPM | WEIGHT: 217 LBS | BODY MASS INDEX: 39.93 KG/M2

## 2019-04-27 PROCEDURE — 97535 SELF CARE MNGMENT TRAINING: CPT

## 2019-04-27 PROCEDURE — 97530 THERAPEUTIC ACTIVITIES: CPT

## 2019-04-27 PROCEDURE — 82962 GLUCOSE BLOOD TEST: CPT

## 2019-04-27 PROCEDURE — 83036 HEMOGLOBIN GLYCOSYLATED A1C: CPT | Performed by: ORTHOPAEDIC SURGERY

## 2019-04-27 PROCEDURE — 97162 PT EVAL MOD COMPLEX 30 MIN: CPT

## 2019-04-27 PROCEDURE — 80048 BASIC METABOLIC PNL TOTAL CA: CPT | Performed by: ORTHOPAEDIC SURGERY

## 2019-04-27 PROCEDURE — 97116 GAIT TRAINING THERAPY: CPT

## 2019-04-27 PROCEDURE — 85018 HEMOGLOBIN: CPT | Performed by: HOSPITALIST

## 2019-04-27 PROCEDURE — 85027 COMPLETE CBC AUTOMATED: CPT | Performed by: ORTHOPAEDIC SURGERY

## 2019-04-27 PROCEDURE — 97167 OT EVAL HIGH COMPLEX 60 MIN: CPT

## 2019-04-27 RX ORDER — PSEUDOEPHEDRINE HCL 30 MG
100 TABLET ORAL 2 TIMES DAILY
Qty: 60 CAPSULE | Refills: 0 | Status: SHIPPED | OUTPATIENT
Start: 2019-04-27 | End: 2019-05-16 | Stop reason: ALTCHOICE

## 2019-04-27 RX ORDER — MELATONIN
325
Status: DISCONTINUED | OUTPATIENT
Start: 2019-04-27 | End: 2019-04-27

## 2019-04-27 RX ORDER — HYDROCODONE BITARTRATE AND ACETAMINOPHEN 10; 325 MG/1; MG/1
1 TABLET ORAL EVERY 6 HOURS PRN
Status: DISCONTINUED | OUTPATIENT
Start: 2019-04-27 | End: 2019-04-27

## 2019-04-27 RX ORDER — OXYCODONE AND ACETAMINOPHEN 10; 325 MG/1; MG/1
2 TABLET ORAL EVERY 4 HOURS PRN
Status: DISCONTINUED | OUTPATIENT
Start: 2019-04-27 | End: 2019-04-27

## 2019-04-27 RX ORDER — MELATONIN
325
Qty: 30 TABLET | Refills: 0 | Status: SHIPPED | OUTPATIENT
Start: 2019-04-28 | End: 2019-06-11

## 2019-04-27 RX ORDER — HYDROCODONE BITARTRATE AND ACETAMINOPHEN 10; 325 MG/1; MG/1
2 TABLET ORAL EVERY 6 HOURS PRN
Status: DISCONTINUED | OUTPATIENT
Start: 2019-04-27 | End: 2019-04-27

## 2019-04-27 RX ORDER — HYDROCODONE BITARTRATE AND ACETAMINOPHEN 10; 325 MG/1; MG/1
1-2 TABLET ORAL EVERY 6 HOURS PRN
Qty: 40 TABLET | Refills: 0 | Status: SHIPPED | OUTPATIENT
Start: 2019-04-27 | End: 2019-05-08

## 2019-04-27 RX ORDER — MORPHINE SULFATE 15 MG/1
15 TABLET, FILM COATED, EXTENDED RELEASE ORAL EVERY 8 HOURS SCHEDULED
Status: DISCONTINUED | OUTPATIENT
Start: 2019-04-27 | End: 2019-04-27

## 2019-04-27 NOTE — PLAN OF CARE
Problem: Diabetes/Glucose Control  Goal: Glucose maintained within prescribed range  Description  INTERVENTIONS:  - Monitor Blood Glucose as ordered  - Assess for signs and symptoms of hyperglycemia and hypoglycemia  - Administer ordered medications to m evaluate response  - Implement non-pharmacological measures as appropriate and evaluate response  - Consider cultural and social influences on pain and pain management  - Manage/alleviate anxiety  - Utilize distraction and/or relaxation techniques  - Monit precautions)  Outcome: Adequate for Discharge    Ray is pod 1  l tsa-slin/immobilizer nwb, ada diet/ac/hs glucose monitoring sliding  scale as indicated, safety intact, oob with  one assist and naida walker , sx drsg changed-see skin assessment, no s/s  inf

## 2019-04-27 NOTE — PLAN OF CARE
Problem: Diabetes/Glucose Control  Goal: Glucose maintained within prescribed range  Description  INTERVENTIONS:  - Monitor Blood Glucose as ordered  - Assess for signs and symptoms of hyperglycemia and hypoglycemia  - Administer ordered medications to m PAIN - ADULT  Goal: Verbalizes/displays adequate comfort level or patient's stated pain goal  Description  INTERVENTIONS:  - Encourage pt to monitor pain and request assistance  - Assess pain using appropriate pain scale  - Administer analgesics based on t orders  - Initiate isolation precautions as appropriate  - Initiate Pressure Ulcer prevention bundle as indicated  4/27/2019 0230 by Yessi James RN  Outcome: Progressing  4/27/2019 0230 by Yessi James RN  Outcome: Progressing     Problem: [place]

## 2019-04-27 NOTE — OCCUPATIONAL THERAPY NOTE
OCCUPATIONAL THERAPY EVALUATION - INPATIENT      Room Number: 426/426-A  Evaluation Date: 4/27/2019  Type of Evaluation: Initial  Presenting Problem: L total shoulder replacement    Physician Order: IP Consult to Occupational Therapy  Reason for Therapy: A be discharged from OT services. The patient's Approx Degree of Impairment: 63.03% has been calculated based on documentation in the [facility] '6 clicks' Inpatient Daily Activity Short Form.   Research supports that patients with this level of impairment may 1/30/2014    Treated with INH for 9 months- Dr. Katie Diaz   • Restless leg syndrome 10/2/2013   • Rheumatoid arthritis ([address] 1/30/2014   • SLEEP APNEA PSG 7-15-13     SAO2 Carlos 82%-Intolerant of CPAP   • Sleep apnea     2L O2 at noc   • Visual impair bed    Occupation/Status: retired  Hand Dominance: Right  Drives: Yes  Patient Regularly Uses: (uses rollator at all times or crutches --adaptive equipment )    Stairs in Home: [address], ranch  Assistive Device(s) Used: crutches, manual and electric w/c, walk Heena    AM-PAC Score:  Score: 13  Approx Degree of Impairment: 63.03%  Standardized Score (AM-PAC Scale): 32.03  CMS Modifier (G-Code): CL    FUNCTIONAL TRANSFER ASSESSMENT  Supine to Sit : Moderate assistance  Sit to Stand: Minimum assistance  Toilet Tr

## 2019-04-27 NOTE — PROGRESS NOTES
ORTHO NOTE    S: Patient complains of mild L shoulder pain today that is controlled with Norco. Denies chest pain or shortness of breath. Patient's drain fell out of his shoulder after surgery yesterday. Patient states he is ready to go home.     O: NEO  L

## 2019-04-27 NOTE — DISCHARGE SUMMARY
[facility] Internal Medicine Discharge Summary   Patient ID:  Donald Oreilly  [id number]  75year old  1/22/1945    Admit date: 4/26/2019    Discharge date and time: 4/27/2019  5:53 PM     Attending Physician: No att. providers found     Via Alicia Macias Tabs  Commonly known as:  LASIX     glimepiride 4 MG Tabs  Commonly known as:  AMARYL     Indomethacin ER 75 MG Cpcr  Commonly known as:  INDOCIN SR     morphINE Sulfate ER 15 MG Tbcr  Commonly known as:  MS CONTIN  Take 1 tablet (15 mg total) by mouth arpit they have    Discharge Diagnoses:   #Left Should OA s/p Left Total Shoulder Arthroplasty:  - po pain meds  - monitor for acute blood loss anemia, pre-op Hg 13->9.2, repeat 9.2  - pt declined ABHIJIT, both pt and wife felt they could manage at home  - follow up 126/58   Pulse: 88   Resp: 18   Temp:      No acute distress, alert and orientedx  Lungs Clear  Heart Regular  Abdomen Benign    [employer] Time Coordinating Care: > than 30 minutes    Patient had opportunity to ask questions and state understand and agree with

## 2019-04-27 NOTE — CM/SW NOTE
Requesting Taj Brown - no preference. Referral placed to [facility]. Orders in.     Kris Akins, [address]

## 2019-04-27 NOTE — CHRONIC PAIN
[facility]  Report of Consultation    Jake Hager. Patient Status:  Inpatient    1945 MRN [id number]   Location [facility] 4W/SW/SE Attending Sincere Warner MD   Hosp Day # 1 PCP No primary care provider on file. with INH for 9 months- Dr. Leopold Plunk   • Restless leg syndrome 10/2/2013   • Rheumatoid arthritis ([facility] [address] 1/30/2014   • SLEEP APNEA PSG 7-15-13     SAO2 Carlos 82%-Intolerant of CPAP   • Sleep apnea     2L O2 at noc   • Visual impairment     glasses   • pack-year smoking history. He has never used smokeless tobacco. He reports that he drinks alcohol. He reports that he has current or past drug history. Drug: Cannabis.     Allergies:  No Known Allergies    Medications:    Current Facility-Administered Medic Intravenous, Q2H PRN  •  Senna (SENOKOT) tab 17.2 mg, 17.2 mg, Oral, Nightly  •  HYDROcodone-acetaminophen (NORCO)  MG per tab 1 tablet, 1 tablet, Oral, Q6H PRN **OR** HYDROcodone-acetaminophen (NORCO)  MG per tab 2 tablet, 2 tablet, Oral, Q6H Glucose-Vitamin C, glucose    Review of Systems:  Pertinent items are noted in HPI. A comprehensive review of systems was negative. Physical Exam:  Blood pressure 121/59, pulse 90, temperature 98.1 °F (36.7 °C), temperature source Oral, resp.  rate 18, Impression:  Patient Active Problem List:     Essential hypertension, benign     Mixed hyperlipidemia     Osteoarthrosis involving lower leg     Disorder of bone and cartilage     Hypersomnia with sleep apnea     Impotence of organic origin     Daniel guzmán testing. Risks and benefits of all options were discussed at length to patients satisfaction during a comprehensive interactive discussion. All questions were answered during extended questions and answer session. Patient agreeable to discussion plan.  Darelmaa Raman

## 2019-04-27 NOTE — PHYSICAL THERAPY NOTE
PHYSICAL THERAPY EVALUATION - INPATIENT     Room Number: 426/426-A  Evaluation Date: 4/27/2019  Type of Evaluation: Initial   Physician Order: PT Eval and Treat    Presenting Problem: left shoulder OA . pt is s/p left reverse TSA on 4/26/19.  pt is NON WB to 75 % with  with activity and noted increase SOB . [place] Therapy education for RN and pt to remain on O2 at all times and not to remove for activity .  Pt with need for 1-2 person assisted mobility this visit as safety precautions and for line management in the home at this time. Spouse and pt verbalize they have all equipment recommended. Therapy will provide HW for d/c to home. Pt and spouse education for fall risk prevention , handout provided.   Pt and spouse education for sling fit , adjustment/po Approx Degree of Impairment: 61.29% has been calculated based on documentation in the [facility] '6 clicks' Inpatient Basic Mobility Short Form. Research supports that patients with this level of impairment may benefit from ABHIJIT.  Therapy would recommend d/c to [facility]   Mr. Terrie Stinson. is a 75year old male with PMH of anxiety, cervical radiculopathy, depression, DM, HTN, pulmonary fibrosis, TAMMY, RA who presented for planned left shoulder arthroplasty.     #Left Should OA s/p Left Total Shoulder Arthroplasty:  - PRN yahir 5-14    Secondary to spinal stenosis-Worsened after steroid injection- S/P lumbar laminectomy 5-14-Now in rehab   • Lung nodule 2/11/2013    Needs noncontrast CT scan of the chest in February of 2014   • Mild aortic stenosis 11/3/2016   • Neuropathy     bi arthroscopy   • OTHER  1972    ORIF right leg   • OTHER  1970    left knee surgery   • OTHER Left 04/26/2019    REVERSE TOTAL SHOULDER ARTHROPLASTY AND BICEPS TENODESIS   • OTHER SURGICAL HISTORY  10-09    Radiofrequency treatment of back with Dr. Izabel Lin limits  · Orientation Level:  oriented to place, oriented to time, oriented to situation and oriented to person  · Safety Judgement:  decreased awareness of need for assistance and decreased awareness of need for safety  · Awareness of Errors:  assistance briefly , pt does quickly desat into mid 70's without O2 , once returned to 2 1/2  Quick resat to baseline noted. AM-PAC '6-Clicks' INPATIENT SHORT FORM - BASIC MOBILITY  How much difficulty does the patient currently have. ..  -   Turning over in be assistance level: supervision/CGA    Goal #3   Current Status Min assist 20 ft x 2 HW with poor gait quality and posture    Goal #4 Per pt family will use stair lift to get pt into home    Goal #4   Current Status Confirmed does have stair lift no stairs

## 2019-04-27 NOTE — ANESTHESIA POST-OP FOLLOW-UP NOTE
Pt s/p TSR with ss interscalene block, POD1. Pain 8/10 yesterday; now 4/10 on po meds.  Numbness is resolving; pt moving all fingers/  Plan: Continue current tx    Consult complete

## 2019-04-28 NOTE — HOME CARE LIAISON
[facility] unable to accept patient. Patient lives out of [facility] service area. Patient has been accepted by [facility] [place] on 4/28/19. PREETI Medina notified.

## 2019-05-14 PROBLEM — Z79.899 MEDICAL CANNABIS USE: Status: RESOLVED | Noted: 2019-02-12 | Resolved: 2019-05-14

## 2019-06-11 PROBLEM — Z96.612 S/P SHOULDER REPLACEMENT, LEFT: Status: ACTIVE | Noted: 2019-06-11

## 2019-10-23 PROBLEM — Z47.1 AFTERCARE FOLLOWING LEFT SHOULDER JOINT REPLACEMENT SURGERY: Status: ACTIVE | Noted: 2019-10-23

## 2019-10-23 PROBLEM — Z96.612 AFTERCARE FOLLOWING LEFT SHOULDER JOINT REPLACEMENT SURGERY: Status: ACTIVE | Noted: 2019-10-23

## 2020-01-10 PROBLEM — N18.30 UNCONTROLLED TYPE 2 DIABETES MELLITUS WITH STAGE 3 CHRONIC KIDNEY DISEASE, WITHOUT LONG-TERM CURRENT USE OF INSULIN (HCC): Status: ACTIVE | Noted: 2020-01-10

## 2020-01-10 PROBLEM — E66.01 MORBID OBESITY DUE TO EXCESS CALORIES (HCC): Status: ACTIVE | Noted: 2020-01-10

## 2020-01-10 PROBLEM — I70.0 AORTIC ATHEROSCLEROSIS (HCC): Status: ACTIVE | Noted: 2020-01-10

## 2020-01-10 PROBLEM — J96.10 CHRONIC RESPIRATORY FAILURE, UNSPECIFIED WHETHER WITH HYPOXIA OR HYPERCAPNIA (HCC): Status: ACTIVE | Noted: 2020-01-10

## 2020-01-10 PROBLEM — E11.65 UNCONTROLLED TYPE 2 DIABETES MELLITUS WITH STAGE 3 CHRONIC KIDNEY DISEASE, WITHOUT LONG-TERM CURRENT USE OF INSULIN (HCC): Status: ACTIVE | Noted: 2020-01-10

## 2020-01-10 PROBLEM — E11.22 UNCONTROLLED TYPE 2 DIABETES MELLITUS WITH STAGE 3 CHRONIC KIDNEY DISEASE, WITHOUT LONG-TERM CURRENT USE OF INSULIN (HCC): Status: ACTIVE | Noted: 2020-01-10

## 2020-01-14 ENCOUNTER — ANESTHESIA EVENT (OUTPATIENT)
Dept: SURGERY | Facility: HOSPITAL | Age: 75
End: 2020-01-14
Payer: MEDICARE

## 2020-01-21 ENCOUNTER — HOSPITAL ENCOUNTER (OUTPATIENT)
Facility: HOSPITAL | Age: 75
Setting detail: HOSPITAL OUTPATIENT SURGERY
Discharge: HOME | End: 2020-01-21
Attending: UROLOGY | Admitting: UROLOGY
Payer: MEDICARE

## 2020-01-21 ENCOUNTER — ANESTHESIA (OUTPATIENT)
Dept: SURGERY | Facility: HOSPITAL | Age: 75
End: 2020-01-21
Payer: MEDICARE

## 2020-01-21 VITALS
HEART RATE: 79 BPM | SYSTOLIC BLOOD PRESSURE: 148 MMHG | HEIGHT: 62 IN | WEIGHT: 216.25 LBS | TEMPERATURE: 99 F | RESPIRATION RATE: 16 BRPM | OXYGEN SATURATION: 95 % | BODY MASS INDEX: 39.79 KG/M2 | DIASTOLIC BLOOD PRESSURE: 78 MMHG

## 2020-01-21 DIAGNOSIS — N32.81 OAB (OVERACTIVE BLADDER): ICD-10-CM

## 2020-01-21 LAB — GLUCOSE BLD-MCNC: 93 MG/DL (ref 70–99)

## 2020-01-21 PROCEDURE — 3E0K8GC INTRODUCTION OF OTHER THERAPEUTIC SUBSTANCE INTO GENITOURINARY TRACT, VIA NATURAL OR ARTIFICIAL OPENING ENDOSCOPIC: ICD-10-PCS | Performed by: UROLOGY

## 2020-01-21 PROCEDURE — 76937 US GUIDE VASCULAR ACCESS: CPT | Performed by: UROLOGY

## 2020-01-21 PROCEDURE — 36410 VNPNXR 3YR/> PHY/QHP DX/THER: CPT | Performed by: UROLOGY

## 2020-01-21 PROCEDURE — 82962 GLUCOSE BLOOD TEST: CPT

## 2020-01-21 RX ORDER — DEXTROSE MONOHYDRATE 25 G/50ML
50 INJECTION, SOLUTION INTRAVENOUS
Status: DISCONTINUED | OUTPATIENT
Start: 2020-01-21 | End: 2020-01-21 | Stop reason: HOSPADM

## 2020-01-21 RX ORDER — NALOXONE HYDROCHLORIDE 0.4 MG/ML
80 INJECTION, SOLUTION INTRAMUSCULAR; INTRAVENOUS; SUBCUTANEOUS AS NEEDED
Status: DISCONTINUED | OUTPATIENT
Start: 2020-01-21 | End: 2020-01-21

## 2020-01-21 RX ORDER — CEFAZOLIN SODIUM/WATER 2 G/20 ML
2 SYRINGE (ML) INTRAVENOUS ONCE
Status: COMPLETED | OUTPATIENT
Start: 2020-01-21 | End: 2020-01-21

## 2020-01-21 RX ORDER — DEXTROSE MONOHYDRATE 25 G/50ML
50 INJECTION, SOLUTION INTRAVENOUS
Status: DISCONTINUED | OUTPATIENT
Start: 2020-01-21 | End: 2020-01-21

## 2020-01-21 RX ORDER — LIDOCAINE HYDROCHLORIDE 20 MG/ML
JELLY TOPICAL AS NEEDED
Status: DISCONTINUED | OUTPATIENT
Start: 2020-01-21 | End: 2020-01-21 | Stop reason: HOSPADM

## 2020-01-21 RX ORDER — ONDANSETRON 2 MG/ML
4 INJECTION INTRAMUSCULAR; INTRAVENOUS AS NEEDED
Status: DISCONTINUED | OUTPATIENT
Start: 2020-01-21 | End: 2020-01-21

## 2020-01-21 RX ORDER — HYDROMORPHONE HYDROCHLORIDE 1 MG/ML
0.4 INJECTION, SOLUTION INTRAMUSCULAR; INTRAVENOUS; SUBCUTANEOUS EVERY 5 MIN PRN
Status: DISCONTINUED | OUTPATIENT
Start: 2020-01-21 | End: 2020-01-21

## 2020-01-21 RX ORDER — SODIUM CHLORIDE, SODIUM LACTATE, POTASSIUM CHLORIDE, CALCIUM CHLORIDE 600; 310; 30; 20 MG/100ML; MG/100ML; MG/100ML; MG/100ML
INJECTION, SOLUTION INTRAVENOUS CONTINUOUS
Status: DISCONTINUED | OUTPATIENT
Start: 2020-01-21 | End: 2020-01-21

## 2020-01-21 RX ORDER — ACETAMINOPHEN 500 MG
1000 TABLET ORAL EVERY 6 HOURS PRN
COMMUNITY

## 2020-01-21 RX ORDER — ACETAMINOPHEN 500 MG
1000 TABLET ORAL ONCE
Status: DISCONTINUED | OUTPATIENT
Start: 2020-01-21 | End: 2020-01-21 | Stop reason: HOSPADM

## 2020-01-21 RX ORDER — ACETAMINOPHEN 500 MG
1000 TABLET ORAL ONCE AS NEEDED
Status: DISCONTINUED | OUTPATIENT
Start: 2020-01-21 | End: 2020-01-21

## 2020-01-21 RX ORDER — LIDOCAINE HYDROCHLORIDE 10 MG/ML
INJECTION, SOLUTION EPIDURAL; INFILTRATION; INTRACAUDAL; PERINEURAL AS NEEDED
Status: DISCONTINUED | OUTPATIENT
Start: 2020-01-21 | End: 2020-01-21 | Stop reason: SURG

## 2020-01-21 RX ADMIN — SODIUM CHLORIDE, SODIUM LACTATE, POTASSIUM CHLORIDE, CALCIUM CHLORIDE: 600; 310; 30; 20 INJECTION, SOLUTION INTRAVENOUS at 12:32:00

## 2020-01-21 RX ADMIN — LIDOCAINE HYDROCHLORIDE 50 MG: 10 INJECTION, SOLUTION EPIDURAL; INFILTRATION; INTRACAUDAL; PERINEURAL at 12:07:00

## 2020-01-21 RX ADMIN — CEFAZOLIN SODIUM/WATER 2 G: 2 G/20 ML SYRINGE (ML) INTRAVENOUS at 12:16:00

## 2020-01-21 NOTE — OPERATIVE REPORT
[facility]  1/21/2020       Bobby Spence  [id number]     PREOPERATIVE DIAGNOSIS: OAB with refractory urinary urgency and frequency  POSTOPERATIVE DIAGNOSIS: OAB with refractory urinary urgency and frequency  PROCEDURE PERFORMED: Cystoscopy with to the postanesthesia care unit in stable condition with no immediately apparent complications. We will ensure that the patient is able to void adequately prior to going home. He will call us with any difficulty voiding or any signs of urinary tract infecti

## 2020-01-21 NOTE — ANESTHESIA PREPROCEDURE EVALUATION
PRE-OP EVALUATION    Patient Name: Bhavana Goncalves.    Pre-op Diagnosis: OAB (overactive bladder) [N32.81]    Procedure(s):  CYSTOSCOPY, INTRAVESICAL BOTOX INJECTION    Surgeon(s) and Role:     Sudha Clark, DO - Primary    Pre-op vitals review Allergies: Patient has no known allergies. Anesthesia Evaluation    Patient summary reviewed. Anesthetic Complications           GI/Hepatic/Renal                                 Cardiovascular      ECG reviewed.           (+) obesity  (+) hy Nazario López MD at [facility] MAIN OR     Social History    Tobacco Use      Smoking status: Former Smoker        Packs/day: 3.25        Years: 18.00        Pack years: 58.5        Types: Cigarettes, Cigars        Start date: 1/1/1957        Quit date: 6/26/

## 2020-01-21 NOTE — INTERVAL H&P NOTE
H&P dated 1/10/20 by Dr. Reddy Voss was reviewed. No changes to be made. Patient seen in preoperative area with his wife at the bedside.  We discussed the surgical plan for today and again discussed risks, benefits, alternatives, and post operative expec

## 2020-01-21 NOTE — ANESTHESIA POSTPROCEDURE EVALUATION
[address] [facility]  Patient Status:  Hospital Outpatient Surgery   Age/Gender 75year old male MRN [id number]   [facility] SURGERY Attending Maggie Jones, [address] [facility] Day # 0 PCP Juana Hamm MD       Anesthesia

## 2020-01-22 LAB — GLUCOSE BLD-MCNC: 114 MG/DL (ref 70–99)

## 2020-04-21 PROBLEM — Z79.891 CHRONIC PRESCRIPTION OPIATE USE: Status: ACTIVE | Noted: 2020-04-21

## 2020-06-23 PROBLEM — G56.03 BILATERAL CARPAL TUNNEL SYNDROME: Status: ACTIVE | Noted: 2020-06-23

## 2020-08-11 PROBLEM — M19.011 PRIMARY OSTEOARTHRITIS OF RIGHT SHOULDER: Status: ACTIVE | Noted: 2020-08-11

## 2021-01-21 PROBLEM — G56.23 CUBITAL TUNNEL SYNDROME OF BOTH UPPER EXTREMITIES: Status: ACTIVE | Noted: 2021-01-21

## 2021-02-15 PROBLEM — E66.01 MORBID OBESITY DUE TO EXCESS CALORIES (HCC): Status: RESOLVED | Noted: 2020-01-10 | Resolved: 2021-02-15

## 2021-02-15 PROBLEM — J96.10 CHRONIC RESPIRATORY FAILURE, UNSPECIFIED WHETHER WITH HYPOXIA OR HYPERCAPNIA (HCC): Status: RESOLVED | Noted: 2020-01-10 | Resolved: 2021-02-15

## 2021-03-24 PROBLEM — J44.9 CHRONIC OBSTRUCTIVE PULMONARY DISEASE, UNSPECIFIED COPD TYPE (HCC): Status: ACTIVE | Noted: 2021-03-24

## 2021-04-13 ENCOUNTER — LAB ENCOUNTER (OUTPATIENT)
Dept: LAB | Facility: HOSPITAL | Age: 76
End: 2021-04-13
Attending: ORTHOPAEDIC SURGERY
Payer: MEDICARE

## 2021-04-13 DIAGNOSIS — Z01.818 PREOPERATIVE TESTING: ICD-10-CM

## 2021-04-16 ENCOUNTER — ANESTHESIA EVENT (OUTPATIENT)
Dept: SURGERY | Facility: HOSPITAL | Age: 76
End: 2021-04-16
Payer: MEDICARE

## 2021-04-16 ENCOUNTER — ANESTHESIA (OUTPATIENT)
Dept: SURGERY | Facility: HOSPITAL | Age: 76
End: 2021-04-16
Payer: MEDICARE

## 2021-04-16 ENCOUNTER — HOSPITAL ENCOUNTER (OUTPATIENT)
Facility: HOSPITAL | Age: 76
Setting detail: HOSPITAL OUTPATIENT SURGERY
Discharge: HOME | End: 2021-04-16
Attending: ORTHOPAEDIC SURGERY | Admitting: ORTHOPAEDIC SURGERY
Payer: MEDICARE

## 2021-04-16 VITALS
TEMPERATURE: 99 F | DIASTOLIC BLOOD PRESSURE: 78 MMHG | BODY MASS INDEX: 39.53 KG/M2 | HEART RATE: 90 BPM | RESPIRATION RATE: 18 BRPM | WEIGHT: 214.81 LBS | SYSTOLIC BLOOD PRESSURE: 146 MMHG | OXYGEN SATURATION: 94 % | HEIGHT: 62 IN

## 2021-04-16 DIAGNOSIS — G56.22 CUBITAL TUNNEL SYNDROME ON LEFT: ICD-10-CM

## 2021-04-16 DIAGNOSIS — Z01.818 PREOPERATIVE TESTING: Primary | ICD-10-CM

## 2021-04-16 PROCEDURE — 76942 ECHO GUIDE FOR BIOPSY: CPT | Performed by: ORTHOPAEDIC SURGERY

## 2021-04-16 PROCEDURE — 64417 NJX AA&/STRD AX NERVE IMG: CPT | Performed by: ORTHOPAEDIC SURGERY

## 2021-04-16 PROCEDURE — 01N40ZZ RELEASE ULNAR NERVE, OPEN APPROACH: ICD-10-PCS | Performed by: ORTHOPAEDIC SURGERY

## 2021-04-16 PROCEDURE — 94010 BREATHING CAPACITY TEST: CPT | Performed by: ORTHOPAEDIC SURGERY

## 2021-04-16 PROCEDURE — 3E0T3BZ INTRODUCTION OF ANESTHETIC AGENT INTO PERIPHERAL NERVES AND PLEXI, PERCUTANEOUS APPROACH: ICD-10-PCS | Performed by: ANESTHESIOLOGY

## 2021-04-16 PROCEDURE — 82962 GLUCOSE BLOOD TEST: CPT

## 2021-04-16 RX ORDER — HALOPERIDOL 5 MG/ML
0.25 INJECTION INTRAMUSCULAR ONCE AS NEEDED
Status: DISCONTINUED | OUTPATIENT
Start: 2021-04-16 | End: 2021-04-16

## 2021-04-16 RX ORDER — ACETAMINOPHEN 500 MG
1000 TABLET ORAL ONCE
Status: COMPLETED | OUTPATIENT
Start: 2021-04-16 | End: 2021-04-16

## 2021-04-16 RX ORDER — CEFAZOLIN SODIUM/WATER 2 G/20 ML
SYRINGE (ML) INTRAVENOUS AS NEEDED
Status: DISCONTINUED | OUTPATIENT
Start: 2021-04-16 | End: 2021-04-16 | Stop reason: SURG

## 2021-04-16 RX ORDER — PROCHLORPERAZINE EDISYLATE 5 MG/ML
5 INJECTION INTRAMUSCULAR; INTRAVENOUS ONCE AS NEEDED
Status: DISCONTINUED | OUTPATIENT
Start: 2021-04-16 | End: 2021-04-16

## 2021-04-16 RX ORDER — MORPHINE SULFATE 4 MG/ML
4 INJECTION, SOLUTION INTRAMUSCULAR; INTRAVENOUS EVERY 10 MIN PRN
Status: DISCONTINUED | OUTPATIENT
Start: 2021-04-16 | End: 2021-04-16

## 2021-04-16 RX ORDER — CEFAZOLIN SODIUM/WATER 2 G/20 ML
2 SYRINGE (ML) INTRAVENOUS ONCE
Status: DISCONTINUED | OUTPATIENT
Start: 2021-04-16 | End: 2021-04-16 | Stop reason: HOSPADM

## 2021-04-16 RX ORDER — MORPHINE SULFATE 10 MG/ML
6 INJECTION, SOLUTION INTRAMUSCULAR; INTRAVENOUS EVERY 10 MIN PRN
Status: DISCONTINUED | OUTPATIENT
Start: 2021-04-16 | End: 2021-04-16

## 2021-04-16 RX ORDER — SODIUM CHLORIDE, SODIUM LACTATE, POTASSIUM CHLORIDE, CALCIUM CHLORIDE 600; 310; 30; 20 MG/100ML; MG/100ML; MG/100ML; MG/100ML
INJECTION, SOLUTION INTRAVENOUS CONTINUOUS
Status: DISCONTINUED | OUTPATIENT
Start: 2021-04-16 | End: 2021-04-16

## 2021-04-16 RX ORDER — HYDROMORPHONE HYDROCHLORIDE 1 MG/ML
0.2 INJECTION, SOLUTION INTRAMUSCULAR; INTRAVENOUS; SUBCUTANEOUS EVERY 5 MIN PRN
Status: DISCONTINUED | OUTPATIENT
Start: 2021-04-16 | End: 2021-04-16

## 2021-04-16 RX ORDER — HYDROMORPHONE HYDROCHLORIDE 1 MG/ML
0.6 INJECTION, SOLUTION INTRAMUSCULAR; INTRAVENOUS; SUBCUTANEOUS EVERY 5 MIN PRN
Status: DISCONTINUED | OUTPATIENT
Start: 2021-04-16 | End: 2021-04-16

## 2021-04-16 RX ORDER — BUPIVACAINE HYDROCHLORIDE 5 MG/ML
INJECTION, SOLUTION EPIDURAL; INTRACAUDAL AS NEEDED
Status: DISCONTINUED | OUTPATIENT
Start: 2021-04-16 | End: 2021-04-16 | Stop reason: HOSPADM

## 2021-04-16 RX ORDER — LIDOCAINE HYDROCHLORIDE 10 MG/ML
INJECTION, SOLUTION EPIDURAL; INFILTRATION; INTRACAUDAL; PERINEURAL AS NEEDED
Status: DISCONTINUED | OUTPATIENT
Start: 2021-04-16 | End: 2021-04-16 | Stop reason: HOSPADM

## 2021-04-16 RX ORDER — ONDANSETRON 2 MG/ML
4 INJECTION INTRAMUSCULAR; INTRAVENOUS EVERY 6 HOURS PRN
Status: DISCONTINUED | OUTPATIENT
Start: 2021-04-16 | End: 2021-04-16

## 2021-04-16 RX ORDER — HYDROCODONE BITARTRATE AND ACETAMINOPHEN 5; 325 MG/1; MG/1
1 TABLET ORAL AS NEEDED
Status: DISCONTINUED | OUTPATIENT
Start: 2021-04-16 | End: 2021-04-16

## 2021-04-16 RX ORDER — DEXAMETHASONE SODIUM PHOSPHATE 10 MG/ML
INJECTION, SOLUTION INTRAMUSCULAR; INTRAVENOUS
Status: COMPLETED | OUTPATIENT
Start: 2021-04-16 | End: 2021-04-16

## 2021-04-16 RX ORDER — LIDOCAINE HYDROCHLORIDE 10 MG/ML
INJECTION, SOLUTION INFILTRATION; PERINEURAL
Status: COMPLETED | OUTPATIENT
Start: 2021-04-16 | End: 2021-04-16

## 2021-04-16 RX ORDER — MORPHINE SULFATE 4 MG/ML
2 INJECTION, SOLUTION INTRAMUSCULAR; INTRAVENOUS EVERY 10 MIN PRN
Status: DISCONTINUED | OUTPATIENT
Start: 2021-04-16 | End: 2021-04-16

## 2021-04-16 RX ORDER — DEXTROSE MONOHYDRATE 25 G/50ML
50 INJECTION, SOLUTION INTRAVENOUS
Status: DISCONTINUED | OUTPATIENT
Start: 2021-04-16 | End: 2021-04-16

## 2021-04-16 RX ORDER — HYDROCODONE BITARTRATE AND ACETAMINOPHEN 5; 325 MG/1; MG/1
2 TABLET ORAL AS NEEDED
Status: DISCONTINUED | OUTPATIENT
Start: 2021-04-16 | End: 2021-04-16

## 2021-04-16 RX ORDER — METOCLOPRAMIDE 10 MG/1
10 TABLET ORAL ONCE
Status: COMPLETED | OUTPATIENT
Start: 2021-04-16 | End: 2021-04-16

## 2021-04-16 RX ORDER — ONDANSETRON 2 MG/ML
4 INJECTION INTRAMUSCULAR; INTRAVENOUS ONCE AS NEEDED
Status: DISCONTINUED | OUTPATIENT
Start: 2021-04-16 | End: 2021-04-16

## 2021-04-16 RX ORDER — NALOXONE HYDROCHLORIDE 0.4 MG/ML
80 INJECTION, SOLUTION INTRAMUSCULAR; INTRAVENOUS; SUBCUTANEOUS AS NEEDED
Status: DISCONTINUED | OUTPATIENT
Start: 2021-04-16 | End: 2021-04-16

## 2021-04-16 RX ORDER — HYDROMORPHONE HYDROCHLORIDE 1 MG/ML
0.4 INJECTION, SOLUTION INTRAMUSCULAR; INTRAVENOUS; SUBCUTANEOUS EVERY 5 MIN PRN
Status: DISCONTINUED | OUTPATIENT
Start: 2021-04-16 | End: 2021-04-16

## 2021-04-16 RX ORDER — ROPIVACAINE HYDROCHLORIDE 5 MG/ML
INJECTION, SOLUTION EPIDURAL; INFILTRATION; PERINEURAL
Status: COMPLETED | OUTPATIENT
Start: 2021-04-16 | End: 2021-04-16

## 2021-04-16 RX ORDER — FAMOTIDINE 20 MG/1
20 TABLET ORAL ONCE
Status: COMPLETED | OUTPATIENT
Start: 2021-04-16 | End: 2021-04-16

## 2021-04-16 RX ORDER — MIDAZOLAM HYDROCHLORIDE 1 MG/ML
INJECTION INTRAMUSCULAR; INTRAVENOUS
Status: COMPLETED | OUTPATIENT
Start: 2021-04-16 | End: 2021-04-16

## 2021-04-16 RX ADMIN — DEXAMETHASONE SODIUM PHOSPHATE 4 MG: 10 INJECTION, SOLUTION INTRAMUSCULAR; INTRAVENOUS at 14:39:00

## 2021-04-16 RX ADMIN — LIDOCAINE HYDROCHLORIDE 1 ML: 10 INJECTION, SOLUTION INFILTRATION; PERINEURAL at 14:39:00

## 2021-04-16 RX ADMIN — MIDAZOLAM HYDROCHLORIDE 2 MG: 1 INJECTION INTRAMUSCULAR; INTRAVENOUS at 14:39:00

## 2021-04-16 RX ADMIN — CEFAZOLIN SODIUM/WATER 2 G: 2 G/20 ML SYRINGE (ML) INTRAVENOUS at 15:06:00

## 2021-04-16 RX ADMIN — ROPIVACAINE HYDROCHLORIDE 30 ML: 5 INJECTION, SOLUTION EPIDURAL; INFILTRATION; PERINEURAL at 14:39:00

## 2021-04-16 RX ADMIN — SODIUM CHLORIDE, SODIUM LACTATE, POTASSIUM CHLORIDE, CALCIUM CHLORIDE: 600; 310; 30; 20 INJECTION, SOLUTION INTRAVENOUS at 15:52:00

## 2021-04-16 NOTE — ANESTHESIA PREPROCEDURE EVALUATION
Anesthesia PreOp Note    HPI:     Marlin Loving. 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 Bryon Gastelum after increased weakness after steroid injection by Dr. Corado Legacy   • OTHER  2014    prostate vaporization   • OTHER  1997    right ulnar nerve transposition   • OTHER  1992    right k breakfast., Disp: 90 tablet, Rfl: 3, 4/15/2021 at 1130  Indomethacin ER 75 MG Oral Cap CR, Take 1 capsule (75 mg total) by mouth daily with breakfast., Disp: 90 capsule, Rfl: 3, 4/15/2021 at 1130  SITagliptin Phosphate (JANUVIA) 100 MG Oral Tab, Take 1 tab mg, Oral, Once, Sincere Warner MD  ceFAZolin sodium (ANCEF/KEFZOL) 2 GM/20ML premix IV syringe 2 g, 2 g, Intravenous, Once, Sincere Warner MD    No current [facility]-ordered outpatient medications on file.       No Known Allergies    Family History   Probl Family:       Frequency of Social Gatherings with Friends and Family:       Attends Hindu Services:       Active Member of Clubs or Organizations:       Attends Club or Organization Meetings:       Marital Status:   Intimate Partner Violence:       Fea Risks Discussed With:  Patient  Discussed plan with:  CRNA      I have informed Amber Shaffer. and/or legal guardian or family member of the nature of the anesthetic plan, benefits, risks including possible dental damage if relevant, major complicati

## 2021-04-16 NOTE — H&P
Anny Bryant  1/22/1945  67year old   male  Job MD Vimal     HPI:   Patient presents with: Follow - Up: Bilateral carpal tunnel syndrome f/u.         The patient complains of pain located left hand.   The pain is increased with pain at nigh • metFORMIN HCl  MG Oral Tablet 24 Hr Take 2 tablets (1,500 mg total) by mouth daily with food.  180 tablet 3   • glimepiride 4 MG Oral Tab Take 1 tablet (4 mg total) by mouth every morning before breakfast. 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 Kelsey Joshi   • Restless leg syndrome 10/2/2013   • Rheumatoid arthritis ([facility] 1/30/2014   • SLEEP APNEA PSG 7-15-13      SAO2 Carlos 82%-Intolerant of C SHOULDER TOTAL Left 4/26/2019     Performed by Sincere Warner MD at [facility] MAIN OR             Family History   Problem Relation Age of Onset   • Cancer Father           colon, prostate   • Cancer Brother           bladder   • Diabetes Brother     • Other on the right.        ASSESSMENT AND PLAN:   Cubital tunnel syndrome on left  (primary encounter diagnosis), s/p B CTR/ R CuTR/ cervical fusion     The risks, indications, benefits, and procedures of both operative and non-operative treatment were discussed

## 2021-04-16 NOTE — ANESTHESIA POSTPROCEDURE EVALUATION
Patient: Carlos Morgan.     Procedure Summary     Date: 04/16/21 Room / Location: [facility] MAIN OR 01 / [facility] MAIN OR    Anesthesia Start: 2677 Anesthesia Stop: 1923    Procedure: LEFT CUBITAL TUNNEL RELEASE (Left ) Diagnosis:       Cubital tunnel syndrome o

## 2021-04-16 NOTE — BRIEF OP NOTE
Pre-Operative Diagnosis: Cubital tunnel syndrome on left [G56.22]     Post-Operative Diagnosis: Cubital tunnel syndrome on left [G56.22]      Procedure Performed:   LEFT CUBITAL TUNNEL RELEASE    Surgeon(s) and Role:     Carolina Hammer MD - Primary

## 2021-04-16 NOTE — ANESTHESIA PROCEDURE NOTES
Peripheral Block    Date/Time: 4/16/2021 2:39 PM  Performed by: Sandor Ware MD  Authorized by: Sandor Ware MD       General Information and Staff    Start Time:  4/16/2021 2:29 PM  End Time:  4/16/2021 2:32 PM  Anesthesiologist:  Sandor Ware MD  P mL  dexamethasone (DECADRON) PF injection 10 mg/ml, 4 mg    Additional Comments

## 2021-04-17 NOTE — OPERATIVE REPORT
[facility]    PATIENT'S NAME: Nico Dentono   ATTENDING PHYSICIAN: Con Burr MD   OPERATING PHYSICIAN: Con Burr MD   PATIENT ACCOUNT#:   [id number]    LOCATION:  [place] [address]  MEDICAL RECORD #:   [id number] induced. Preoperatively, he received a block. His left upper extremity was prepped and draped in a sterile fashion. Supplemental local anesthesia was given at the incision site with 1% lidocaine and 0.5% Marcaine in a 1:1 mixture.   The patient had a andrei

## 2021-04-29 PROBLEM — Z47.89 AFTERCARE FOLLOWING SURGERY OF THE MUSCULOSKELETAL SYSTEM: Status: ACTIVE | Noted: 2021-04-29

## 2021-09-17 PROBLEM — E11.65 UNCONTROLLED TYPE 2 DIABETES MELLITUS WITH STAGE 3 CHRONIC KIDNEY DISEASE, WITHOUT LONG-TERM CURRENT USE OF INSULIN (HCC): Status: RESOLVED | Noted: 2020-01-10 | Resolved: 2021-09-17

## 2021-09-17 PROBLEM — N18.30 UNCONTROLLED TYPE 2 DIABETES MELLITUS WITH STAGE 3 CHRONIC KIDNEY DISEASE, WITHOUT LONG-TERM CURRENT USE OF INSULIN (HCC): Status: RESOLVED | Noted: 2020-01-10 | Resolved: 2021-09-17

## 2021-09-17 PROBLEM — E11.22 UNCONTROLLED TYPE 2 DIABETES MELLITUS WITH STAGE 3 CHRONIC KIDNEY DISEASE, WITHOUT LONG-TERM CURRENT USE OF INSULIN (HCC): Status: RESOLVED | Noted: 2020-01-10 | Resolved: 2021-09-17

## 2022-11-23 ENCOUNTER — OFF PREMISE (OUTPATIENT)
Dept: OTHER | Age: 77
End: 2022-11-23

## 2022-11-23 PROCEDURE — 93010 ELECTROCARDIOGRAM REPORT: CPT | Performed by: INTERNAL MEDICINE

## 2023-08-30 ENCOUNTER — APPOINTMENT (OUTPATIENT)
Dept: GENERAL RADIOLOGY | Age: 78
DRG: 189 | End: 2023-08-30
Attending: EMERGENCY MEDICINE

## 2023-08-30 ENCOUNTER — APPOINTMENT (OUTPATIENT)
Dept: CT IMAGING | Age: 78
DRG: 189 | End: 2023-08-30

## 2023-08-30 ENCOUNTER — APPOINTMENT (OUTPATIENT)
Dept: ULTRASOUND IMAGING | Age: 78
DRG: 189 | End: 2023-08-30
Attending: INTERNAL MEDICINE

## 2023-08-30 ENCOUNTER — HOSPITAL ENCOUNTER (INPATIENT)
Age: 78
LOS: 2 days | Discharge: HOME | DRG: 189 | End: 2023-09-01
Attending: EMERGENCY MEDICINE | Admitting: FAMILY MEDICINE

## 2023-08-30 DIAGNOSIS — J96.22 ACUTE ON CHRONIC RESPIRATORY FAILURE WITH HYPERCAPNIA (CMD): Primary | ICD-10-CM

## 2023-08-30 DIAGNOSIS — J81.0 ACUTE PULMONARY EDEMA (CMD): ICD-10-CM

## 2023-08-30 LAB
ABO + RH BLD: NORMAL
ABO + RH BLD: NORMAL
ALBUMIN SERPL-MCNC: 3.4 G/DL (ref 3.6–5.1)
ALBUMIN/GLOB SERPL: 0.6 {RATIO} (ref 1–2.4)
ALP SERPL-CCNC: 87 UNITS/L (ref 45–117)
ALT SERPL-CCNC: 11 UNITS/L
ANION GAP SERPL CALC-SCNC: 8 MMOL/L (ref 7–19)
APPEARANCE UR: CLEAR
APTT PPP: 28 SEC (ref 22–30)
AST SERPL-CCNC: 20 UNITS/L
BACTERIA #/AREA URNS HPF: NORMAL /HPF
BASE EXCESS / DEFICIT, ARTERIAL - RESPIRATORY: 14 MMOL/L (ref -2–3)
BASE EXCESS / DEFICIT, ARTERIAL - RESPIRATORY: 15 MMOL/L (ref -2–3)
BASE EXCESS / DEFICIT, ARTERIAL - RESPIRATORY: 18 MMOL/L (ref -2–3)
BASE EXCESS / DEFICIT, VENOUS - RESPIRATORY: 17 MMOL/L (ref -2–2)
BASOPHILS # BLD: 0.1 K/MCL (ref 0–0.3)
BASOPHILS NFR BLD: 1 %
BDY SITE: ABNORMAL
BILIRUB SERPL-MCNC: 0.3 MG/DL (ref 0.2–1)
BILIRUB UR QL STRIP: NEGATIVE
BLD GP AB SCN SERPL QL GEL: NEGATIVE
BODY TEMPERATURE: 36.8 DEGREES
BODY TEMPERATURE: 37 DEGREES
BUN SERPL-MCNC: 43 MG/DL (ref 6–20)
BUN/CREAT SERPL: 30 (ref 7–25)
CA-I BLD-SCNC: 1.17 MMOL/L (ref 1.15–1.29)
CA-I BLD-SCNC: 1.18 MMOL/L (ref 1.15–1.29)
CA-I BLD-SCNC: 1.19 MMOL/L (ref 1.15–1.29)
CALCIUM SERPL-MCNC: 9 MG/DL (ref 8.4–10.2)
CHLORIDE SERPL-SCNC: 101 MMOL/L (ref 97–110)
CO2 SERPL-SCNC: 37 MMOL/L (ref 21–32)
COHGB MFR BLDV: 0.6 %
COHGB MFR BLDV: 1.6 %
COHGB MFR BLDV: 1.7 %
COLOR UR: COLORLESS
CONDITION: ABNORMAL
CREAT SERPL-MCNC: 1.45 MG/DL (ref 0.67–1.17)
DEPRECATED RDW RBC: 49.3 FL (ref 39–50)
EGFRCR SERPLBLD CKD-EPI 2021: 49 ML/MIN/{1.73_M2}
EOSINOPHIL # BLD: 0.6 K/MCL (ref 0–0.5)
EOSINOPHIL NFR BLD: 6 %
ERYTHROCYTE [DISTWIDTH] IN BLOOD: 13.5 % (ref 11–15)
FASTING DURATION TIME PATIENT: ABNORMAL H
FLUAV RNA RESP QL NAA+PROBE: NOT DETECTED
FLUBV RNA RESP QL NAA+PROBE: NOT DETECTED
GLOBULIN SER-MCNC: 5.5 G/DL (ref 2–4)
GLUCOSE BLDC GLUCOMTR-MCNC: 137 MG/DL (ref 70–99)
GLUCOSE BLDC GLUCOMTR-MCNC: 154 MG/DL (ref 70–99)
GLUCOSE BLDC GLUCOMTR-MCNC: 62 MG/DL (ref 70–99)
GLUCOSE BLDC GLUCOMTR-MCNC: 80 MG/DL (ref 70–99)
GLUCOSE BLDC GLUCOMTR-MCNC: 82 MG/DL (ref 70–99)
GLUCOSE BLDC GLUCOMTR-MCNC: 96 MG/DL (ref 70–99)
GLUCOSE SERPL-MCNC: 94 MG/DL (ref 70–99)
GLUCOSE UR STRIP-MCNC: NEGATIVE MG/DL
HCO3 BLDA-SCNC: 42 MMOL/L (ref 22–28)
HCO3 BLDA-SCNC: 44 MMOL/L (ref 22–28)
HCO3 BLDA-SCNC: 47 MMOL/L (ref 22–28)
HCO3 BLDV-SCNC: 45.9 MMOL/L (ref 22–28)
HCT VFR BLD CALC: 35.6 % (ref 39–51)
HGB BLD-MCNC: 10.1 G/DL (ref 13–17)
HGB BLD-MCNC: 10.2 G/DL (ref 13–17)
HGB BLD-MCNC: 10.5 G/DL (ref 13–17)
HGB BLD-MCNC: 9.4 G/DL (ref 13–17)
HGB UR QL STRIP: NEGATIVE
HYALINE CASTS #/AREA URNS LPF: NORMAL /LPF
IMM GRANULOCYTES # BLD AUTO: 0 K/MCL (ref 0–0.2)
IMM GRANULOCYTES # BLD: 0 %
INR PPP: 1.1
KETONES UR STRIP-MCNC: NEGATIVE MG/DL
LACTATE BLDA-SCNC: 0.8 MMOL/L
LACTATE BLDA-SCNC: 2.5 MMOL/L
LACTATE BLDV-SCNC: 3.1 MMOL/L
LACTATE BLDV-SCNC: 3.1 MMOL/L (ref 0–2)
LEUKOCYTE ESTERASE UR QL STRIP: NEGATIVE
LYMPHOCYTES # BLD: 4.5 K/MCL (ref 1–4)
LYMPHOCYTES NFR BLD: 42 %
MAGNESIUM SERPL-MCNC: 2.9 MG/DL (ref 1.7–2.4)
MCH RBC QN AUTO: 28.7 PG (ref 26–34)
MCHC RBC AUTO-ENTMCNC: 28.7 G/DL (ref 32–36.5)
MCV RBC AUTO: 100 FL (ref 78–100)
METHGB MFR BLDMV: 1.3 %
METHGB MFR BLDMV: 1.5 %
METHGB MFR BLDMV: 1.6 %
MONOCYTES # BLD: 0.7 K/MCL (ref 0.3–0.9)
MONOCYTES NFR BLD: 6 %
MRSA DNA SPEC QL NAA+PROBE: NOT DETECTED
NEUTROPHILS # BLD: 4.9 K/MCL (ref 1.8–7.7)
NEUTROPHILS NFR BLD: 45 %
NITRITE UR QL STRIP: NEGATIVE
NRBC BLD MANUAL-RTO: 0 /100 WBC
NT-PROBNP SERPL-MCNC: 3572 PG/ML
OXYHGB MFR BLDA: 69.7 % (ref 94–98)
OXYHGB MFR BLDA: 96.8 % (ref 94–98)
OXYHGB MFR BLDV: 26.6 % (ref 60–80)
PCO2 BLDA: 68 MM HG (ref 35–48)
PCO2 BLDA: 72 MM HG (ref 35–48)
PCO2 BLDA: 88 MM HG (ref 35–48)
PCO2 BLDV: 86 MM HG (ref 41–54)
PH BLDA: 7.3 UNITS (ref 7.35–7.45)
PH BLDA: 7.39 UNITS (ref 7.35–7.45)
PH BLDA: 7.42 UNITS (ref 7.35–7.45)
PH BLDV: 7.33 UNITS (ref 7.35–7.45)
PH UR STRIP: 7 [PH] (ref 5–7)
PHOSPHATE SERPL-MCNC: 4.1 MG/DL (ref 2.4–4.7)
PLATELET # BLD AUTO: 165 K/MCL (ref 140–450)
PO2 BLDA: 342 MM HG (ref 83–108)
PO2 BLDA: 37 MM HG (ref 83–108)
PO2 BLDA: 82 MM HG (ref 83–108)
PO2 BLDV: 20 MM HG (ref 35–42)
POTASSIUM BLD-SCNC: 5.8 MMOL/L (ref 3.4–5.1)
POTASSIUM BLD-SCNC: 5.9 MMOL/L (ref 3.4–5.1)
POTASSIUM BLD-SCNC: 6.4 MMOL/L (ref 3.4–5.1)
POTASSIUM SERPL-SCNC: 5.6 MMOL/L (ref 3.4–5.1)
PROCALCITONIN SERPL IA-MCNC: <0.05 NG/ML
PROT SERPL-MCNC: 8.9 G/DL (ref 6.4–8.2)
PROT UR STRIP-MCNC: NEGATIVE MG/DL
PROTHROMBIN TIME: 11 SEC (ref 9.7–11.8)
RAINBOW EXTRA TUBES HOLD SPECIMEN: NORMAL
RAINBOW EXTRA TUBES HOLD SPECIMEN: NORMAL
RBC # BLD: 3.56 MIL/MCL (ref 4.5–5.9)
RBC #/AREA URNS HPF: NORMAL /HPF
SAO2 % BLDA: 10 % (ref 15–23)
SAO2 % BLDA: 100 % (ref 95–99)
SAO2 % BLDA: 14 % (ref 15–23)
SAO2 % BLDA: 72 % (ref 95–99)
SAO2 % BLDA: 96 % (ref 95–99)
SAO2 DF BLDV: 27 % (ref 60–80)
SARS-COV-2 RNA RESP QL NAA+PROBE: NOT DETECTED
SERVICE CMNT-IMP: NORMAL
SERVICE CMNT-IMP: NORMAL
SODIUM BLD-SCNC: 140 MMOL/L (ref 135–145)
SODIUM BLD-SCNC: 141 MMOL/L (ref 135–145)
SODIUM BLD-SCNC: 142 MMOL/L (ref 135–145)
SODIUM SERPL-SCNC: 140 MMOL/L (ref 135–145)
SP GR UR STRIP: 1.01 (ref 1–1.03)
SQUAMOUS #/AREA URNS HPF: NORMAL /HPF
TROPONIN I SERPL DL<=0.01 NG/ML-MCNC: 29 NG/L
TYPE AND SCREEN EXPIRATION DATE: NORMAL
UROBILINOGEN UR STRIP-MCNC: 0.2 MG/DL
WBC # BLD: 10.9 K/MCL (ref 4.2–11)
WBC #/AREA URNS HPF: NORMAL /HPF

## 2023-08-30 PROCEDURE — 83036 HEMOGLOBIN GLYCOSYLATED A1C: CPT | Performed by: HOSPITALIST

## 2023-08-30 PROCEDURE — 36600 WITHDRAWAL OF ARTERIAL BLOOD: CPT

## 2023-08-30 PROCEDURE — 82962 GLUCOSE BLOOD TEST: CPT

## 2023-08-30 PROCEDURE — 94660 CPAP INITIATION&MGMT: CPT

## 2023-08-30 PROCEDURE — 85018 HEMOGLOBIN: CPT

## 2023-08-30 PROCEDURE — 83605 ASSAY OF LACTIC ACID: CPT | Performed by: EMERGENCY MEDICINE

## 2023-08-30 PROCEDURE — 84132 ASSAY OF SERUM POTASSIUM: CPT

## 2023-08-30 PROCEDURE — 84100 ASSAY OF PHOSPHORUS: CPT | Performed by: EMERGENCY MEDICINE

## 2023-08-30 PROCEDURE — 82330 ASSAY OF CALCIUM: CPT

## 2023-08-30 PROCEDURE — 85025 COMPLETE CBC W/AUTO DIFF WBC: CPT | Performed by: EMERGENCY MEDICINE

## 2023-08-30 PROCEDURE — 83735 ASSAY OF MAGNESIUM: CPT | Performed by: EMERGENCY MEDICINE

## 2023-08-30 PROCEDURE — 83605 ASSAY OF LACTIC ACID: CPT

## 2023-08-30 PROCEDURE — 10002807 HB RX 258

## 2023-08-30 PROCEDURE — 87040 BLOOD CULTURE FOR BACTERIA: CPT | Performed by: EMERGENCY MEDICINE

## 2023-08-30 PROCEDURE — 84484 ASSAY OF TROPONIN QUANT: CPT | Performed by: EMERGENCY MEDICINE

## 2023-08-30 PROCEDURE — 87641 MR-STAPH DNA AMP PROBE: CPT | Performed by: INTERNAL MEDICINE

## 2023-08-30 PROCEDURE — 87086 URINE CULTURE/COLONY COUNT: CPT | Performed by: EMERGENCY MEDICINE

## 2023-08-30 PROCEDURE — 99291 CRITICAL CARE FIRST HOUR: CPT

## 2023-08-30 PROCEDURE — 86900 BLOOD TYPING SEROLOGIC ABO: CPT | Performed by: EMERGENCY MEDICINE

## 2023-08-30 PROCEDURE — 87899 AGENT NOS ASSAY W/OPTIC: CPT | Performed by: INTERNAL MEDICINE

## 2023-08-30 PROCEDURE — 80053 COMPREHEN METABOLIC PANEL: CPT | Performed by: EMERGENCY MEDICINE

## 2023-08-30 PROCEDURE — 86901 BLOOD TYPING SEROLOGIC RH(D): CPT | Performed by: EMERGENCY MEDICINE

## 2023-08-30 PROCEDURE — 10002800 HB RX 250 W HCPCS: Performed by: INTERNAL MEDICINE

## 2023-08-30 PROCEDURE — 36415 COLL VENOUS BLD VENIPUNCTURE: CPT

## 2023-08-30 PROCEDURE — 84295 ASSAY OF SERUM SODIUM: CPT

## 2023-08-30 PROCEDURE — 83880 ASSAY OF NATRIURETIC PEPTIDE: CPT | Performed by: EMERGENCY MEDICINE

## 2023-08-30 PROCEDURE — 93970 EXTREMITY STUDY: CPT

## 2023-08-30 PROCEDURE — 71275 CT ANGIOGRAPHY CHEST: CPT

## 2023-08-30 PROCEDURE — 10002807 HB RX 258: Performed by: INTERNAL MEDICINE

## 2023-08-30 PROCEDURE — 82375 ASSAY CARBOXYHB QUANT: CPT

## 2023-08-30 PROCEDURE — 10002800 HB RX 250 W HCPCS

## 2023-08-30 PROCEDURE — 84145 PROCALCITONIN (PCT): CPT | Performed by: INTERNAL MEDICINE

## 2023-08-30 PROCEDURE — 10002805 HB CONTRAST AGENT

## 2023-08-30 PROCEDURE — 10004180 HB COUNTER-TRANSPORT

## 2023-08-30 PROCEDURE — 10002801 HB RX 250 W/O HCPCS: Performed by: EMERGENCY MEDICINE

## 2023-08-30 PROCEDURE — 96375 TX/PRO/DX INJ NEW DRUG ADDON: CPT

## 2023-08-30 PROCEDURE — 81001 URINALYSIS AUTO W/SCOPE: CPT | Performed by: EMERGENCY MEDICINE

## 2023-08-30 PROCEDURE — 82803 BLOOD GASES ANY COMBINATION: CPT

## 2023-08-30 PROCEDURE — 85730 THROMBOPLASTIN TIME PARTIAL: CPT | Performed by: EMERGENCY MEDICINE

## 2023-08-30 PROCEDURE — 10002801 HB RX 250 W/O HCPCS

## 2023-08-30 PROCEDURE — 10003585 HB ROOM CHARGE INTERMEDIATE CARE

## 2023-08-30 PROCEDURE — 93005 ELECTROCARDIOGRAM TRACING: CPT | Performed by: EMERGENCY MEDICINE

## 2023-08-30 PROCEDURE — 85610 PROTHROMBIN TIME: CPT | Performed by: EMERGENCY MEDICINE

## 2023-08-30 PROCEDURE — 96374 THER/PROPH/DIAG INJ IV PUSH: CPT

## 2023-08-30 PROCEDURE — 0240U SARS-COV-2/INFLUENZA BY PCR: CPT | Performed by: INTERNAL MEDICINE

## 2023-08-30 PROCEDURE — 71045 X-RAY EXAM CHEST 1 VIEW: CPT

## 2023-08-30 RX ORDER — PRAVASTATIN SODIUM 40 MG
40 TABLET ORAL NIGHTLY
COMMUNITY
Start: 2023-06-15

## 2023-08-30 RX ORDER — CHOLECALCIFEROL (VITAMIN D3) 50 MCG
50 TABLET ORAL DAILY
COMMUNITY

## 2023-08-30 RX ORDER — METHYLPREDNISOLONE SODIUM SUCCINATE 40 MG/ML
40 INJECTION, POWDER, LYOPHILIZED, FOR SOLUTION INTRAMUSCULAR; INTRAVENOUS EVERY 8 HOURS SCHEDULED
Status: DISCONTINUED | OUTPATIENT
Start: 2023-08-30 | End: 2023-09-01 | Stop reason: HOSPADM

## 2023-08-30 RX ORDER — DEXTROSE MONOHYDRATE 25 G/50ML
12.5 INJECTION, SOLUTION INTRAVENOUS ONCE
Status: COMPLETED | OUTPATIENT
Start: 2023-08-30 | End: 2023-08-30

## 2023-08-30 RX ORDER — SENNOSIDES 8.6 MG
1300 CAPSULE ORAL EVERY 8 HOURS PRN
COMMUNITY

## 2023-08-30 RX ORDER — NALOXONE HYDROCHLORIDE 4 MG/.1ML
4 SPRAY NASAL
COMMUNITY
Start: 2023-04-18

## 2023-08-30 RX ORDER — MULTIVIT WITH MINERALS/LUTEIN
1000 TABLET ORAL EVERY EVENING
COMMUNITY

## 2023-08-30 RX ORDER — VITAMIN B COMPLEX
1 CAPSULE ORAL DAILY
COMMUNITY

## 2023-08-30 RX ORDER — OXYCODONE HYDROCHLORIDE 5 MG/1
5 TABLET ORAL DAILY PRN
COMMUNITY
Start: 2023-08-19

## 2023-08-30 RX ORDER — DEXTROSE MONOHYDRATE 25 G/50ML
25 INJECTION, SOLUTION INTRAVENOUS ONCE
Status: COMPLETED | OUTPATIENT
Start: 2023-08-30 | End: 2023-08-30

## 2023-08-30 RX ORDER — CALCIUM GLUCONATE 20 MG/ML
1 INJECTION, SOLUTION INTRAVENOUS ONCE
Status: COMPLETED | OUTPATIENT
Start: 2023-08-30 | End: 2023-08-30

## 2023-08-30 RX ORDER — METFORMIN HYDROCHLORIDE 750 MG/1
750 TABLET, EXTENDED RELEASE ORAL 2 TIMES DAILY WITH MEALS
COMMUNITY
Start: 2023-06-15

## 2023-08-30 RX ORDER — INDOMETHACIN 75 MG/1
CAPSULE, EXTENDED RELEASE ORAL
Status: ON HOLD | COMMUNITY
Start: 2023-06-12 | End: 2023-09-01 | Stop reason: HOSPADM

## 2023-08-30 RX ORDER — FUROSEMIDE 10 MG/ML
40 INJECTION INTRAMUSCULAR; INTRAVENOUS ONCE
Status: COMPLETED | OUTPATIENT
Start: 2023-08-30 | End: 2023-08-30

## 2023-08-30 RX ORDER — ASCORBATE CALCIUM/BIOFLAVONOID 1000-200MG
1 TABLET, EXTENDED RELEASE ORAL EVERY MORNING
COMMUNITY

## 2023-08-30 RX ORDER — MORPHINE SULFATE 15 MG/1
TABLET, FILM COATED, EXTENDED RELEASE ORAL
COMMUNITY
Start: 2023-08-19

## 2023-08-30 RX ORDER — GLIMEPIRIDE 4 MG/1
TABLET ORAL
COMMUNITY
Start: 2023-07-05

## 2023-08-30 RX ADMIN — DEXTROSE MONOHYDRATE 25 G: 25 INJECTION, SOLUTION INTRAVENOUS at 15:17

## 2023-08-30 RX ADMIN — CEFEPIME 1000 MG: 1 INJECTION, POWDER, FOR SOLUTION INTRAMUSCULAR; INTRAVENOUS at 20:30

## 2023-08-30 RX ADMIN — DEXTROSE MONOHYDRATE 12.5 G: 25 INJECTION, SOLUTION INTRAVENOUS at 15:23

## 2023-08-30 RX ADMIN — METHYLPREDNISOLONE SODIUM SUCCINATE 40 MG: 40 INJECTION, POWDER, FOR SOLUTION INTRAMUSCULAR; INTRAVENOUS at 19:48

## 2023-08-30 RX ADMIN — FUROSEMIDE 40 MG: 10 INJECTION, SOLUTION INTRAMUSCULAR; INTRAVENOUS at 17:19

## 2023-08-30 RX ADMIN — IOHEXOL 85 ML: 350 INJECTION, SOLUTION INTRAVENOUS at 18:10

## 2023-08-30 RX ADMIN — DEXTROSE MONOHYDRATE 25 G: 25 INJECTION, SOLUTION INTRAVENOUS at 19:48

## 2023-08-30 RX ADMIN — SODIUM CHLORIDE 5 UNITS: 9 INJECTION, SOLUTION INTRAVENOUS at 15:24

## 2023-08-30 RX ADMIN — CALCIUM GLUCONATE 1 G: 20 INJECTION, SOLUTION INTRAVENOUS at 19:44

## 2023-08-30 ASSESSMENT — PAIN SCALES - GENERAL: PAINLEVEL_OUTOF10: 0

## 2023-08-31 ENCOUNTER — APPOINTMENT (OUTPATIENT)
Dept: CARDIOLOGY | Age: 78
DRG: 189 | End: 2023-08-31
Attending: HOSPITALIST

## 2023-08-31 LAB
ANION GAP SERPL CALC-SCNC: 8 MMOL/L (ref 7–19)
AORTIC VALVE AREA (AVA): 0.92
ATRIAL RATE (BPM): 88
AV MEAN GRADIENT (AVMG): 14
AV MEAN VELOCITY (AVMV): 1.81
AV PEAK GRADIENT (AVPG): 20
AV PEAK VELOCITY (AVPV): 2.25
AV STENOSIS SEVERITY TEXT: NORMAL
AVI LVOT PEAK GRADIENT (LVOTMG): 1
BUN SERPL-MCNC: 37 MG/DL (ref 6–20)
BUN/CREAT SERPL: 31 (ref 7–25)
CALCIUM SERPL-MCNC: 8.7 MG/DL (ref 8.4–10.2)
CHLORIDE SERPL-SCNC: 100 MMOL/L (ref 97–110)
CO2 SERPL-SCNC: 37 MMOL/L (ref 21–32)
CREAT SERPL-MCNC: 1.19 MG/DL (ref 0.67–1.17)
E WAVE DECELARATION TIME (MDT): 9.51
EGFRCR SERPLBLD CKD-EPI 2021: 62 ML/MIN/{1.73_M2}
FASTING DURATION TIME PATIENT: ABNORMAL H
GLUCOSE BLDC GLUCOMTR-MCNC: 173 MG/DL (ref 70–99)
GLUCOSE BLDC GLUCOMTR-MCNC: 221 MG/DL (ref 70–99)
GLUCOSE BLDC GLUCOMTR-MCNC: 230 MG/DL (ref 70–99)
GLUCOSE BLDC GLUCOMTR-MCNC: 232 MG/DL (ref 70–99)
GLUCOSE SERPL-MCNC: 165 MG/DL (ref 70–99)
HBA1C MFR BLD: 6.5 % (ref 4.5–5.6)
INTERVENTRICULAR SEPTUM IN END DIASTOLE (IVSD): 1.47
IRON SATN MFR SERPL: 12 % (ref 15–45)
IRON SERPL-MCNC: 37 MCG/DL (ref 65–175)
L PNEUMO1 AG UR QL IA.RAPID: NORMAL
LACTATE BLDV-SCNC: 1.1 MMOL/L (ref 0–2)
LEFT INTERNAL DIMENSION IN SYSTOLE (LVSD): 0.9
LEFT VENTRICULAR INTERNAL DIMENSION IN DIASTOLE (LVDD): 3.1
LEFT VENTRICULAR POSTERIOR WALL IN END DIASTOLE (LVPW): 5.2
LV EF: NORMAL %
LVOT 2D (LVOTD): 31.2
LVOT VTI (LVOTVTI): 1.21
MV E TISSUE VEL MED (MESV): 12.8
MV E WAVE VEL/E TISSUE VEL MED(MSR): 9.68
MV PEAK A VELOCITY (MVPAV): 183
MV PEAK E VELOCITY (MVPEV): 1.14
POTASSIUM SERPL-SCNC: 5.1 MMOL/L (ref 3.4–5.1)
PR-INTERVAL (MSEC): 188
QRS-INTERVAL (MSEC): 102
QT-INTERVAL (MSEC): 384
QTC: 465
R AXIS (DEGREES): -28
REPORT TEXT: NORMAL
S PNEUM AG UR QL IA.RAPID: NORMAL
SODIUM SERPL-SCNC: 140 MMOL/L (ref 135–145)
T AXIS (DEGREES): -25
TIBC SERPL-MCNC: 313 MCG/DL (ref 250–450)
TRICUSPID VALVE PEAK REGURGITATION VELOCITY (TRPV): 3.7
TV ESTIMATED RIGHT ARTERIAL PRESSURE (RAP): 11.3
VENTRICULAR RATE EKG/MIN (BPM): 88

## 2023-08-31 PROCEDURE — 10002803 HB RX 637: Performed by: HOSPITALIST

## 2023-08-31 PROCEDURE — 10002807 HB RX 258: Performed by: INTERNAL MEDICINE

## 2023-08-31 PROCEDURE — 36415 COLL VENOUS BLD VENIPUNCTURE: CPT | Performed by: INTERNAL MEDICINE

## 2023-08-31 PROCEDURE — 96372 THER/PROPH/DIAG INJ SC/IM: CPT

## 2023-08-31 PROCEDURE — 82746 ASSAY OF FOLIC ACID SERUM: CPT | Performed by: HOSPITALIST

## 2023-08-31 PROCEDURE — 83605 ASSAY OF LACTIC ACID: CPT | Performed by: INTERNAL MEDICINE

## 2023-08-31 PROCEDURE — 10002800 HB RX 250 W HCPCS: Performed by: INTERNAL MEDICINE

## 2023-08-31 PROCEDURE — 96372 THER/PROPH/DIAG INJ SC/IM: CPT | Performed by: HOSPITALIST

## 2023-08-31 PROCEDURE — 10002807 HB RX 258

## 2023-08-31 PROCEDURE — 10003585 HB ROOM CHARGE INTERMEDIATE CARE

## 2023-08-31 PROCEDURE — 10002800 HB RX 250 W HCPCS: Performed by: HOSPITALIST

## 2023-08-31 PROCEDURE — 94660 CPAP INITIATION&MGMT: CPT

## 2023-08-31 PROCEDURE — 10002800 HB RX 250 W HCPCS

## 2023-08-31 PROCEDURE — 10002801 HB RX 250 W/O HCPCS: Performed by: INTERNAL MEDICINE

## 2023-08-31 PROCEDURE — 93306 TTE W/DOPPLER COMPLETE: CPT

## 2023-08-31 PROCEDURE — 80048 BASIC METABOLIC PNL TOTAL CA: CPT | Performed by: INTERNAL MEDICINE

## 2023-08-31 PROCEDURE — 83540 ASSAY OF IRON: CPT | Performed by: HOSPITALIST

## 2023-08-31 RX ORDER — ONDANSETRON 2 MG/ML
4 INJECTION INTRAMUSCULAR; INTRAVENOUS EVERY 6 HOURS PRN
Status: DISCONTINUED | OUTPATIENT
Start: 2023-08-31 | End: 2023-09-01 | Stop reason: HOSPADM

## 2023-08-31 RX ORDER — PRAVASTATIN SODIUM 10 MG
40 TABLET ORAL NIGHTLY
Status: DISCONTINUED | OUTPATIENT
Start: 2023-08-31 | End: 2023-09-01 | Stop reason: HOSPADM

## 2023-08-31 RX ORDER — DEXTROSE MONOHYDRATE 25 G/50ML
12.5 INJECTION, SOLUTION INTRAVENOUS PRN
Status: DISCONTINUED | OUTPATIENT
Start: 2023-08-31 | End: 2023-09-01 | Stop reason: HOSPADM

## 2023-08-31 RX ORDER — FUROSEMIDE 10 MG/ML
20 INJECTION INTRAMUSCULAR; INTRAVENOUS DAILY
Status: DISCONTINUED | OUTPATIENT
Start: 2023-08-31 | End: 2023-09-01

## 2023-08-31 RX ORDER — INSULIN GLARGINE 100 [IU]/ML
10 INJECTION, SOLUTION SUBCUTANEOUS DAILY
Status: DISCONTINUED | OUTPATIENT
Start: 2023-08-31 | End: 2023-09-01 | Stop reason: HOSPADM

## 2023-08-31 RX ORDER — NICOTINE POLACRILEX 4 MG
30 LOZENGE BUCCAL PRN
Status: DISCONTINUED | OUTPATIENT
Start: 2023-08-31 | End: 2023-09-01 | Stop reason: HOSPADM

## 2023-08-31 RX ORDER — HEPARIN SODIUM 5000 [USP'U]/ML
5000 INJECTION, SOLUTION INTRAVENOUS; SUBCUTANEOUS EVERY 8 HOURS SCHEDULED
Status: DISCONTINUED | OUTPATIENT
Start: 2023-08-31 | End: 2023-09-01 | Stop reason: HOSPADM

## 2023-08-31 RX ORDER — LANOLIN ALCOHOL/MO/W.PET/CERES
6 CREAM (GRAM) TOPICAL NIGHTLY PRN
Status: DISCONTINUED | OUTPATIENT
Start: 2023-08-31 | End: 2023-09-01 | Stop reason: HOSPADM

## 2023-08-31 RX ORDER — NICOTINE POLACRILEX 4 MG
15 LOZENGE BUCCAL PRN
Status: DISCONTINUED | OUTPATIENT
Start: 2023-08-31 | End: 2023-09-01 | Stop reason: HOSPADM

## 2023-08-31 RX ORDER — ACETAMINOPHEN 325 MG/1
650 TABLET ORAL EVERY 6 HOURS PRN
Status: DISCONTINUED | OUTPATIENT
Start: 2023-08-31 | End: 2023-09-01 | Stop reason: HOSPADM

## 2023-08-31 RX ORDER — INDOMETHACIN 25 MG/1
25 CAPSULE ORAL
Status: DISCONTINUED | OUTPATIENT
Start: 2023-08-31 | End: 2023-09-01 | Stop reason: HOSPADM

## 2023-08-31 RX ORDER — DEXTROSE MONOHYDRATE 25 G/50ML
25 INJECTION, SOLUTION INTRAVENOUS PRN
Status: DISCONTINUED | OUTPATIENT
Start: 2023-08-31 | End: 2023-09-01 | Stop reason: HOSPADM

## 2023-08-31 RX ORDER — ASCORBIC ACID 500 MG
1000 TABLET ORAL EVERY EVENING
Status: DISCONTINUED | OUTPATIENT
Start: 2023-08-31 | End: 2023-09-01 | Stop reason: HOSPADM

## 2023-08-31 RX ORDER — MORPHINE SULFATE 15 MG/1
15 TABLET, FILM COATED, EXTENDED RELEASE ORAL EVERY 12 HOURS SCHEDULED
Status: DISCONTINUED | OUTPATIENT
Start: 2023-08-31 | End: 2023-09-01 | Stop reason: HOSPADM

## 2023-08-31 RX ORDER — INSULIN LISPRO 100 [IU]/ML
3 INJECTION, SOLUTION INTRAVENOUS; SUBCUTANEOUS
Status: DISCONTINUED | OUTPATIENT
Start: 2023-08-31 | End: 2023-09-01 | Stop reason: HOSPADM

## 2023-08-31 RX ORDER — OXYCODONE HYDROCHLORIDE 5 MG/1
5 TABLET ORAL EVERY 6 HOURS PRN
Status: DISCONTINUED | OUTPATIENT
Start: 2023-08-31 | End: 2023-09-01 | Stop reason: HOSPADM

## 2023-08-31 RX ORDER — POLYETHYLENE GLYCOL 3350 17 G/17G
17 POWDER, FOR SOLUTION ORAL DAILY PRN
Status: DISCONTINUED | OUTPATIENT
Start: 2023-08-31 | End: 2023-09-01 | Stop reason: HOSPADM

## 2023-08-31 RX ORDER — AMOXICILLIN 250 MG
2 CAPSULE ORAL 2 TIMES DAILY
Status: DISCONTINUED | OUTPATIENT
Start: 2023-08-31 | End: 2023-09-01 | Stop reason: HOSPADM

## 2023-08-31 RX ADMIN — PIPERACILLIN AND TAZOBACTAM 3.38 G: 3; .375 INJECTION, POWDER, FOR SOLUTION INTRAVENOUS at 14:22

## 2023-08-31 RX ADMIN — PIPERACILLIN AND TAZOBACTAM 3.38 G: 3; .375 INJECTION, POWDER, FOR SOLUTION INTRAVENOUS at 23:11

## 2023-08-31 RX ADMIN — OXYCODONE HYDROCHLORIDE AND ACETAMINOPHEN 1000 MG: 500 TABLET ORAL at 17:54

## 2023-08-31 RX ADMIN — INSULIN LISPRO 4 UNITS: 100 INJECTION, SOLUTION INTRAVENOUS; SUBCUTANEOUS at 14:22

## 2023-08-31 RX ADMIN — SENNOSIDES AND DOCUSATE SODIUM 2 TABLET: 50; 8.6 TABLET ORAL at 11:11

## 2023-08-31 RX ADMIN — SENNOSIDES AND DOCUSATE SODIUM 2 TABLET: 50; 8.6 TABLET ORAL at 20:25

## 2023-08-31 RX ADMIN — DOXYCYCLINE 100 MG: 100 INJECTION, POWDER, LYOPHILIZED, FOR SOLUTION INTRAVENOUS at 09:46

## 2023-08-31 RX ADMIN — DOXYCYCLINE 100 MG: 100 INJECTION, POWDER, LYOPHILIZED, FOR SOLUTION INTRAVENOUS at 21:24

## 2023-08-31 RX ADMIN — MORPHINE SULFATE 15 MG: 15 TABLET, FILM COATED, EXTENDED RELEASE ORAL at 20:25

## 2023-08-31 RX ADMIN — INSULIN LISPRO 3 UNITS: 100 INJECTION, SOLUTION INTRAVENOUS; SUBCUTANEOUS at 17:58

## 2023-08-31 RX ADMIN — Medication 50 MCG: at 11:12

## 2023-08-31 RX ADMIN — CEFEPIME 1000 MG: 1 INJECTION, POWDER, FOR SOLUTION INTRAMUSCULAR; INTRAVENOUS at 08:54

## 2023-08-31 RX ADMIN — HEPARIN SODIUM 5000 UNITS: 5000 INJECTION INTRAVENOUS; SUBCUTANEOUS at 21:24

## 2023-08-31 RX ADMIN — METHYLPREDNISOLONE SODIUM SUCCINATE 40 MG: 40 INJECTION, POWDER, FOR SOLUTION INTRAMUSCULAR; INTRAVENOUS at 21:48

## 2023-08-31 RX ADMIN — FUROSEMIDE 20 MG: 10 INJECTION, SOLUTION INTRAMUSCULAR; INTRAVENOUS at 17:54

## 2023-08-31 RX ADMIN — METHYLPREDNISOLONE SODIUM SUCCINATE 40 MG: 40 INJECTION, POWDER, FOR SOLUTION INTRAMUSCULAR; INTRAVENOUS at 05:35

## 2023-08-31 RX ADMIN — METHYLPREDNISOLONE SODIUM SUCCINATE 40 MG: 40 INJECTION, POWDER, FOR SOLUTION INTRAMUSCULAR; INTRAVENOUS at 14:22

## 2023-08-31 RX ADMIN — HEPARIN SODIUM 5000 UNITS: 5000 INJECTION INTRAVENOUS; SUBCUTANEOUS at 14:24

## 2023-08-31 RX ADMIN — PRAVASTATIN SODIUM 40 MG: 10 TABLET ORAL at 20:25

## 2023-08-31 RX ADMIN — MORPHINE SULFATE 15 MG: 15 TABLET, FILM COATED, EXTENDED RELEASE ORAL at 11:12

## 2023-08-31 RX ADMIN — INSULIN LISPRO 4 UNITS: 100 INJECTION, SOLUTION INTRAVENOUS; SUBCUTANEOUS at 17:57

## 2023-08-31 RX ADMIN — INSULIN GLARGINE 10 UNITS: 100 INJECTION, SOLUTION SUBCUTANEOUS at 11:13

## 2023-08-31 RX ADMIN — SODIUM ZIRCONIUM CYCLOSILICATE 10 G: 10 POWDER, FOR SUSPENSION ORAL at 17:53

## 2023-08-31 SDOH — HEALTH STABILITY: PHYSICAL HEALTH: DO YOU HAVE SERIOUS DIFFICULTY WALKING OR CLIMBING STAIRS?: YES

## 2023-08-31 SDOH — ECONOMIC STABILITY: TRANSPORTATION INSECURITY
IN THE PAST 12 MONTHS, HAS LACK OF TRANSPORTATION KEPT YOU FROM MEETINGS, WORK, OR FROM GETTING THINGS NEEDED FOR DAILY LIVING?: NO

## 2023-08-31 SDOH — SOCIAL STABILITY: SOCIAL NETWORK

## 2023-08-31 SDOH — HEALTH STABILITY: GENERAL
BECAUSE OF A PHYSICAL, MENTAL, OR EMOTIONAL CONDITION, DO YOU HAVE SERIOUS DIFFICULTY CONCENTRATING, REMEMBERING OR MAKING DECISIONS?: NO

## 2023-08-31 SDOH — ECONOMIC STABILITY: HOUSING INSECURITY: WHAT IS YOUR LIVING SITUATION TODAY?: SPOUSE

## 2023-08-31 SDOH — SOCIAL STABILITY: SOCIAL NETWORK
HOW OFTEN DO YOU SEE OR TALK TO PEOPLE THAT YOU CARE ABOUT AND FEEL CLOSE TO? (FOR EXAMPLE: TALKING TO FRIENDS ON THE PHONE, VISITING FRIENDS OR FAMILY, GOING TO CHURCH OR CLUB MEETINGS): 5 OR MORE TIMES A WEEK

## 2023-08-31 SDOH — ECONOMIC STABILITY: TRANSPORTATION INSECURITY
IN THE PAST 12 MONTHS, HAS THE LACK OF TRANSPORTATION KEPT YOU FROM MEDICAL APPOINTMENTS OR FROM GETTING MEDICATIONS?: NO

## 2023-08-31 SDOH — ECONOMIC STABILITY: HOUSING INSECURITY: ARE YOU WORRIED ABOUT LOSING YOUR HOUSING?: NO

## 2023-08-31 SDOH — HEALTH STABILITY: GENERAL: BECAUSE OF A PHYSICAL, MENTAL, OR EMOTIONAL CONDITION, DO YOU HAVE DIFFICULTY DOING ERRANDS ALONE?: NO

## 2023-08-31 SDOH — ECONOMIC STABILITY: GENERAL

## 2023-08-31 SDOH — HEALTH STABILITY: PHYSICAL HEALTH: DO YOU HAVE DIFFICULTY DRESSING OR BATHING?: YES

## 2023-08-31 SDOH — ECONOMIC STABILITY: FOOD INSECURITY: HOW OFTEN IN THE PAST 12 MONTHS WERE YOU WORRIED OR STRESSED ABOUT HAVING ENOUGH MONEY TO BUY NUTRITIOUS MEALS?: NEVER

## 2023-08-31 SDOH — ECONOMIC STABILITY: HOUSING INSECURITY: WHAT IS YOUR LIVING SITUATION TODAY?: HOUSE

## 2023-08-31 ASSESSMENT — ENCOUNTER SYMPTOMS
DIAPHORESIS: 0
CONFUSION: 0
WOUND: 0
DIARRHEA: 0
TREMORS: 0
SLEEP DISTURBANCE: 0
CHILLS: 0
NUMBNESS: 0
EYE DISCHARGE: 0
RHINORRHEA: 0
SEIZURES: 0
ABDOMINAL DISTENTION: 0
CONSTIPATION: 0
AGITATION: 0
APPETITE CHANGE: 0
EYE ITCHING: 0
EYE REDNESS: 0
LIGHT-HEADEDNESS: 0
SHORTNESS OF BREATH: 1
POLYDIPSIA: 0
SPEECH DIFFICULTY: 0
EYE PAIN: 0
FEVER: 0
COUGH: 1
SORE THROAT: 0
BACK PAIN: 0
VOMITING: 0
DIZZINESS: 0
CHEST TIGHTNESS: 0
NERVOUS/ANXIOUS: 0
NAUSEA: 0
ABDOMINAL PAIN: 0
WHEEZING: 0
FATIGUE: 0
BRUISES/BLEEDS EASILY: 0
ADENOPATHY: 0
HEADACHES: 0
WEAKNESS: 0

## 2023-08-31 ASSESSMENT — PAIN SCALES - GENERAL
PAINLEVEL_OUTOF10: 0
PAINLEVEL_OUTOF10: 0
PAINLEVEL_OUTOF10: 7
PAINLEVEL_OUTOF10: 0
PAINLEVEL_OUTOF10: 5
PAINLEVEL_OUTOF10: 5
PAINLEVEL_OUTOF10: 0

## 2023-08-31 ASSESSMENT — ORIENTATION MEMORY CONCENTRATION TEST (OMCT)
OMCT SCORE: 6
OMCT INTERPRETATION: 0-6: NO SIGNIFICANT IMPAIRMENT
WHAT MONTH IS IT NOW: INCORRECT
REPEAT THE NAME AND ADDRESS I ASKED YOU TO REMEMBER: CORRECT
WHAT YEAR IS IT NOW (MUST BE EXACT): CORRECT
COUNT BACKWARDS FROM 20 TO 1: CORRECT
WHAT TIME IS IT (NO WATCH OR CLOCK): INCORRECT
SAY THE MONTHS IN REVERSE ORDER STARTING WITH LAST MONTH: CORRECT

## 2023-08-31 ASSESSMENT — ACTIVITIES OF DAILY LIVING (ADL)
DRESSING: NEEDS ASSISTANCE
TOILETING: NEEDS ASSISTANCE
RECENT_DECLINE_ADL: NO
ADL_BEFORE_ADMISSION: NEEDS/REQUIRES ASSISTANCE
FEEDING: INDEPENDENT
ADL_SCORE: 7
ADL_SHORT_OF_BREATH: NO
BATHING: NEEDS ASSISTANCE

## 2023-08-31 ASSESSMENT — PATIENT HEALTH QUESTIONNAIRE - PHQ9
IS PATIENT ABLE TO COMPLETE PHQ2 OR PHQ9: YES
1. LITTLE INTEREST OR PLEASURE IN DOING THINGS: NOT AT ALL
CLINICAL INTERPRETATION OF PHQ2 SCORE: NO FURTHER SCREENING NEEDED
SUM OF ALL RESPONSES TO PHQ9 QUESTIONS 1 AND 2: 0
2. FEELING DOWN, DEPRESSED OR HOPELESS: NOT AT ALL
SUM OF ALL RESPONSES TO PHQ9 QUESTIONS 1 AND 2: 0

## 2023-08-31 ASSESSMENT — LIFESTYLE VARIABLES
ALCOHOL_USE_STATUS: NO OR LOW RISK WITH VALIDATED TOOL
HOW OFTEN DO YOU HAVE 6 OR MORE DRINKS ON ONE OCCASION: NEVER
HOW OFTEN DO YOU HAVE A DRINK CONTAINING ALCOHOL: NEVER
AUDIT-C TOTAL SCORE: 0
HOW MANY STANDARD DRINKS CONTAINING ALCOHOL DO YOU HAVE ON A TYPICAL DAY: 0,1 OR 2

## 2023-08-31 ASSESSMENT — COLUMBIA-SUICIDE SEVERITY RATING SCALE - C-SSRS
IS THE PATIENT ABLE TO COMPLETE C-SSRS: YES
2. HAVE YOU ACTUALLY HAD ANY THOUGHTS OF KILLING YOURSELF?: NO
1. WITHIN THE PAST MONTH, HAVE YOU WISHED YOU WERE DEAD OR WISHED YOU COULD GO TO SLEEP AND NOT WAKE UP?: NO
6. HAVE YOU EVER DONE ANYTHING, STARTED TO DO ANYTHING, OR PREPARED TO DO ANYTHING TO END YOUR LIFE?: NO

## 2023-09-01 VITALS
DIASTOLIC BLOOD PRESSURE: 73 MMHG | HEART RATE: 68 BPM | BODY MASS INDEX: 41.5 KG/M2 | HEIGHT: 61 IN | WEIGHT: 219.8 LBS | RESPIRATION RATE: 15 BRPM | TEMPERATURE: 98.6 F | SYSTOLIC BLOOD PRESSURE: 137 MMHG | OXYGEN SATURATION: 99 %

## 2023-09-01 PROBLEM — J96.22 ACUTE ON CHRONIC RESPIRATORY FAILURE WITH HYPERCAPNIA (CMD): Status: RESOLVED | Noted: 2023-08-30 | Resolved: 2023-09-01

## 2023-09-01 LAB
ANION GAP SERPL CALC-SCNC: 6 MMOL/L (ref 7–19)
BACTERIA UR CULT: NORMAL
BUN SERPL-MCNC: 38 MG/DL (ref 6–20)
BUN/CREAT SERPL: 31 (ref 7–25)
CALCIUM SERPL-MCNC: 8.9 MG/DL (ref 8.4–10.2)
CHLORIDE SERPL-SCNC: 101 MMOL/L (ref 97–110)
CO2 SERPL-SCNC: 39 MMOL/L (ref 21–32)
CREAT SERPL-MCNC: 1.23 MG/DL (ref 0.67–1.17)
DEPRECATED RDW RBC: 47.3 FL (ref 39–50)
EGFRCR SERPLBLD CKD-EPI 2021: 60 ML/MIN/{1.73_M2}
ERYTHROCYTE [DISTWIDTH] IN BLOOD: 13.7 % (ref 11–15)
FASTING DURATION TIME PATIENT: ABNORMAL H
FOLATE SERPL-MCNC: >24 NG/ML
GLUCOSE BLDC GLUCOMTR-MCNC: 142 MG/DL (ref 70–99)
GLUCOSE BLDC GLUCOMTR-MCNC: 144 MG/DL (ref 70–99)
GLUCOSE BLDC GLUCOMTR-MCNC: 158 MG/DL (ref 70–99)
GLUCOSE SERPL-MCNC: 179 MG/DL (ref 70–99)
HCT VFR BLD CALC: 30.4 % (ref 39–51)
HGB BLD-MCNC: 9.3 G/DL (ref 13–17)
MAGNESIUM SERPL-MCNC: 2.2 MG/DL (ref 1.7–2.4)
MCH RBC QN AUTO: 28.8 PG (ref 26–34)
MCHC RBC AUTO-ENTMCNC: 30.6 G/DL (ref 32–36.5)
MCV RBC AUTO: 94.1 FL (ref 78–100)
NRBC BLD MANUAL-RTO: 0 /100 WBC
NT-PROBNP SERPL-MCNC: 8830 PG/ML
PLATELET # BLD AUTO: 152 K/MCL (ref 140–450)
POTASSIUM SERPL-SCNC: 4.6 MMOL/L (ref 3.4–5.1)
RBC # BLD: 3.23 MIL/MCL (ref 4.5–5.9)
SODIUM SERPL-SCNC: 141 MMOL/L (ref 135–145)
VIT B12 SERPL-MCNC: 462 PG/ML (ref 211–911)
WBC # BLD: 7.4 K/MCL (ref 4.2–11)

## 2023-09-01 PROCEDURE — 83735 ASSAY OF MAGNESIUM: CPT | Performed by: HOSPITALIST

## 2023-09-01 PROCEDURE — 10002800 HB RX 250 W HCPCS

## 2023-09-01 PROCEDURE — 94762 N-INVAS EAR/PLS OXIMTRY CONT: CPT

## 2023-09-01 PROCEDURE — 97161 PT EVAL LOW COMPLEX 20 MIN: CPT

## 2023-09-01 PROCEDURE — 80048 BASIC METABOLIC PNL TOTAL CA: CPT | Performed by: HOSPITALIST

## 2023-09-01 PROCEDURE — 96372 THER/PROPH/DIAG INJ SC/IM: CPT

## 2023-09-01 PROCEDURE — 97530 THERAPEUTIC ACTIVITIES: CPT

## 2023-09-01 PROCEDURE — 10002807 HB RX 258: Performed by: INTERNAL MEDICINE

## 2023-09-01 PROCEDURE — 10002807 HB RX 258

## 2023-09-01 PROCEDURE — 10002803 HB RX 637: Performed by: HOSPITALIST

## 2023-09-01 PROCEDURE — 10002800 HB RX 250 W HCPCS: Performed by: HOSPITALIST

## 2023-09-01 PROCEDURE — 36415 COLL VENOUS BLD VENIPUNCTURE: CPT | Performed by: HOSPITALIST

## 2023-09-01 PROCEDURE — 83880 ASSAY OF NATRIURETIC PEPTIDE: CPT

## 2023-09-01 PROCEDURE — 10002801 HB RX 250 W/O HCPCS: Performed by: INTERNAL MEDICINE

## 2023-09-01 PROCEDURE — 97535 SELF CARE MNGMENT TRAINING: CPT | Performed by: OCCUPATIONAL THERAPIST

## 2023-09-01 PROCEDURE — 97165 OT EVAL LOW COMPLEX 30 MIN: CPT | Performed by: OCCUPATIONAL THERAPIST

## 2023-09-01 PROCEDURE — 10002800 HB RX 250 W HCPCS: Performed by: INTERNAL MEDICINE

## 2023-09-01 PROCEDURE — 96372 THER/PROPH/DIAG INJ SC/IM: CPT | Performed by: HOSPITALIST

## 2023-09-01 PROCEDURE — 85027 COMPLETE CBC AUTOMATED: CPT | Performed by: HOSPITALIST

## 2023-09-01 PROCEDURE — 97116 GAIT TRAINING THERAPY: CPT

## 2023-09-01 RX ORDER — PREDNISONE 10 MG/1
TABLET ORAL
Qty: 14 TABLET | Refills: 0 | Status: SHIPPED | OUTPATIENT
Start: 2023-09-01 | End: 2023-09-07

## 2023-09-01 RX ORDER — FUROSEMIDE 20 MG/1
20 TABLET ORAL DAILY
Qty: 30 TABLET | Refills: 1 | Status: SHIPPED | OUTPATIENT
Start: 2023-09-01

## 2023-09-01 RX ADMIN — SENNOSIDES AND DOCUSATE SODIUM 2 TABLET: 50; 8.6 TABLET ORAL at 09:36

## 2023-09-01 RX ADMIN — MORPHINE SULFATE 15 MG: 15 TABLET, FILM COATED, EXTENDED RELEASE ORAL at 09:36

## 2023-09-01 RX ADMIN — INSULIN LISPRO 3 UNITS: 100 INJECTION, SOLUTION INTRAVENOUS; SUBCUTANEOUS at 14:12

## 2023-09-01 RX ADMIN — INSULIN LISPRO 2 UNITS: 100 INJECTION, SOLUTION INTRAVENOUS; SUBCUTANEOUS at 09:37

## 2023-09-01 RX ADMIN — INSULIN LISPRO 3 UNITS: 100 INJECTION, SOLUTION INTRAVENOUS; SUBCUTANEOUS at 09:36

## 2023-09-01 RX ADMIN — PIPERACILLIN AND TAZOBACTAM 3.38 G: 3; .375 INJECTION, POWDER, FOR SOLUTION INTRAVENOUS at 14:13

## 2023-09-01 RX ADMIN — Medication 50 MCG: at 09:36

## 2023-09-01 RX ADMIN — DOXYCYCLINE 100 MG: 100 INJECTION, POWDER, LYOPHILIZED, FOR SOLUTION INTRAVENOUS at 09:39

## 2023-09-01 RX ADMIN — METHYLPREDNISOLONE SODIUM SUCCINATE 40 MG: 40 INJECTION, POWDER, FOR SOLUTION INTRAMUSCULAR; INTRAVENOUS at 05:25

## 2023-09-01 RX ADMIN — HEPARIN SODIUM 5000 UNITS: 5000 INJECTION INTRAVENOUS; SUBCUTANEOUS at 05:25

## 2023-09-01 RX ADMIN — INSULIN GLARGINE 10 UNITS: 100 INJECTION, SOLUTION SUBCUTANEOUS at 09:36

## 2023-09-01 RX ADMIN — PIPERACILLIN AND TAZOBACTAM 3.38 G: 3; .375 INJECTION, POWDER, FOR SOLUTION INTRAVENOUS at 05:27

## 2023-09-01 RX ADMIN — METHYLPREDNISOLONE SODIUM SUCCINATE 40 MG: 40 INJECTION, POWDER, FOR SOLUTION INTRAMUSCULAR; INTRAVENOUS at 14:12

## 2023-09-01 RX ADMIN — SODIUM ZIRCONIUM CYCLOSILICATE 10 G: 10 POWDER, FOR SUSPENSION ORAL at 09:36

## 2023-09-01 RX ADMIN — HEPARIN SODIUM 5000 UNITS: 5000 INJECTION INTRAVENOUS; SUBCUTANEOUS at 14:12

## 2023-09-01 ASSESSMENT — COGNITIVE AND FUNCTIONAL STATUS - GENERAL
DO YOU HAVE SERIOUS DIFFICULTY WALKING OR CLIMBING STAIRS: YES
HELP NEEDED DRESSING REGULAR UPPER BODY CLOTHING: A LITTLE
HELP NEEDED FOR PERSONAL GROOMING: A LOT
BASIC_MOBILITY_RAW_SCORE: 17
BASIC_MOBILITY_CONVERTED_SCORE: 39.67
DAILY_ACTIVITY_CONVERTED_SCORE: 33.39
DO YOU HAVE DIFFICULTY DRESSING OR BATHING: YES
HELP NEEDED FOR TOILETING: A LOT
HELP NEEDED FOR BATHING: A LOT
BECAUSE OF A PHYSICAL, MENTAL, OR EMOTIONAL CONDITION, DO YOU HAVE DIFFICULTY DOING ERRANDS ALONE: NO
DAILY_ACTIVITY_RAW_SCORE: 14
BECAUSE OF A PHYSICAL, MENTAL, OR EMOTIONAL CONDITION, DO YOU HAVE SERIOUS DIFFICULTY CONCENTRATING, REMEMBERING OR MAKING DECISIONS: NO
HELP NEEDED DRESSING REGULAR LOWER BODY CLOTHING: TOTAL

## 2023-09-01 ASSESSMENT — ACTIVITIES OF DAILY LIVING (ADL)
HOME_MANAGEMENT_TIME_ENTRY: 24
EATING: INDEPENDENT
PRIOR_ADL_TOILETING: MODIFIED INDEPENDENT
PRIOR_ADL_BATHING: MINIMAL ASSIST (MIN)
GROOMING: INDEPENDENT

## 2023-09-01 ASSESSMENT — PAIN SCALES - GENERAL
PAINLEVEL_OUTOF10: 0

## 2023-09-01 ASSESSMENT — ENCOUNTER SYMPTOMS: PAIN SEVERITY NOW: 0

## 2023-09-04 LAB
BACTERIA BLD CULT: NORMAL
BACTERIA BLD CULT: NORMAL

## 2024-03-03 ENCOUNTER — HOSPITAL ENCOUNTER (INPATIENT)
Age: 79
LOS: 3 days | Discharge: HOME | DRG: 314 | End: 2024-03-06
Attending: EMERGENCY MEDICINE | Admitting: INTERNAL MEDICINE

## 2024-03-03 ENCOUNTER — APPOINTMENT (OUTPATIENT)
Dept: GENERAL RADIOLOGY | Age: 79
DRG: 314 | End: 2024-03-03
Attending: EMERGENCY MEDICINE

## 2024-03-03 DIAGNOSIS — Z71.89 GOALS OF CARE, COUNSELING/DISCUSSION: ICD-10-CM

## 2024-03-03 DIAGNOSIS — J84.9 ILD (INTERSTITIAL LUNG DISEASE) (CMD): Primary | ICD-10-CM

## 2024-03-03 DIAGNOSIS — J96.11 CHRONIC HYPOXIC RESPIRATORY FAILURE (CMD): ICD-10-CM

## 2024-03-03 DIAGNOSIS — Z51.5 ENCOUNTER FOR PALLIATIVE CARE: ICD-10-CM

## 2024-03-03 DIAGNOSIS — I50.9 ACUTE ON CHRONIC CONGESTIVE HEART FAILURE, UNSPECIFIED HEART FAILURE TYPE (CMD): ICD-10-CM

## 2024-03-03 DIAGNOSIS — R09.02 HYPOXIA: ICD-10-CM

## 2024-03-03 LAB
ABO + RH BLD: NORMAL
ALBUMIN SERPL-MCNC: 3.2 G/DL (ref 3.6–5.1)
ALBUMIN/GLOB SERPL: 0.6 {RATIO} (ref 1–2.4)
ALP SERPL-CCNC: 100 UNITS/L (ref 45–117)
ALT SERPL-CCNC: 11 UNITS/L
ANION GAP SERPL CALC-SCNC: 10 MMOL/L (ref 7–19)
APPEARANCE UR: CLEAR
APTT PPP: 30 SEC (ref 22–32)
AST SERPL-CCNC: 19 UNITS/L
ATRIAL RATE (BPM): 104
BACTERIA #/AREA URNS HPF: ABNORMAL /HPF
BASE EXCESS / DEFICIT, VENOUS - RESPIRATORY: 17 MMOL/L (ref -2–2)
BASE EXCESS / DEFICIT, VENOUS - RESPIRATORY: 17 MMOL/L (ref -2–2)
BASOPHILS # BLD: 0.1 K/MCL (ref 0–0.3)
BASOPHILS NFR BLD: 1 %
BDY SITE: ABNORMAL
BDY SITE: ABNORMAL
BILIRUB SERPL-MCNC: 0.4 MG/DL (ref 0.2–1)
BILIRUB UR QL STRIP: NEGATIVE
BLD GP AB SCN SERPL QL GEL: NEGATIVE
BODY TEMPERATURE: 37 DEGREES C
BODY TEMPERATURE: 37 DEGREES C
BUN SERPL-MCNC: 30 MG/DL (ref 6–20)
BUN/CREAT SERPL: 29 (ref 7–25)
CA-I BLD-SCNC: 1.14 MMOL/L (ref 1.15–1.29)
CA-I BLD-SCNC: 1.17 MMOL/L (ref 1.15–1.29)
CALCIUM SERPL-MCNC: 9.3 MG/DL (ref 8.4–10.2)
CHLORIDE SERPL-SCNC: 100 MMOL/L (ref 97–110)
CO2 SERPL-SCNC: 36 MMOL/L (ref 21–32)
COHGB MFR BLDV: 1.1 %
COHGB MFR BLDV: 1.8 %
COLOR UR: YELLOW
CONDITION: ABNORMAL
CONDITION: ABNORMAL
CREAT SERPL-MCNC: 1.05 MG/DL (ref 0.67–1.17)
DEPRECATED RDW RBC: 46.5 FL (ref 39–50)
EGFRCR SERPLBLD CKD-EPI 2021: 72 ML/MIN/{1.73_M2}
EOSINOPHIL # BLD: 0.3 K/MCL (ref 0–0.5)
EOSINOPHIL NFR BLD: 3 %
ERYTHROCYTE [DISTWIDTH] IN BLOOD: 13.1 % (ref 11–15)
FASTING DURATION TIME PATIENT: ABNORMAL H
GLOBULIN SER-MCNC: 5.3 G/DL (ref 2–4)
GLUCOSE BLDC GLUCOMTR-MCNC: 118 MG/DL (ref 70–99)
GLUCOSE BLDC GLUCOMTR-MCNC: 275 MG/DL (ref 70–99)
GLUCOSE SERPL-MCNC: 105 MG/DL (ref 70–99)
GLUCOSE UR STRIP-MCNC: NEGATIVE MG/DL
HCO3 BLDV-SCNC: 45 MMOL/L (ref 22–28)
HCO3 BLDV-SCNC: 46.7 MMOL/L (ref 22–28)
HCT VFR BLD CALC: 35.3 % (ref 39–51)
HGB BLD-MCNC: 10.5 G/DL (ref 13–17)
HGB BLD-MCNC: 10.5 G/DL (ref 13–17)
HGB BLD-MCNC: 11.1 G/DL (ref 13–17)
HGB UR QL STRIP: NEGATIVE
HYALINE CASTS #/AREA URNS LPF: ABNORMAL /LPF
IMM GRANULOCYTES # BLD AUTO: 0.1 K/MCL (ref 0–0.2)
IMM GRANULOCYTES # BLD: 0 %
INR PPP: 1.1
KETONES UR STRIP-MCNC: NEGATIVE MG/DL
LACTATE BLDV-SCNC: 3.4 MMOL/L
LACTATE BLDV-SCNC: 3.6 MMOL/L
LACTATE BLDV-SCNC: 3.7 MMOL/L (ref 0–2)
LACTATE BLDV-SCNC: 3.8 MMOL/L (ref 0–2)
LEUKOCYTE ESTERASE UR QL STRIP: NEGATIVE
LYMPHOCYTES # BLD: 2.9 K/MCL (ref 1–4)
LYMPHOCYTES NFR BLD: 24 %
MAGNESIUM SERPL-MCNC: 2.2 MG/DL (ref 1.7–2.4)
MCH RBC QN AUTO: 29 PG (ref 26–34)
MCHC RBC AUTO-ENTMCNC: 29.7 G/DL (ref 32–36.5)
MCV RBC AUTO: 97.5 FL (ref 78–100)
METHGB MFR BLDMV: 0 %
METHGB MFR BLDMV: 0.5 %
MONOCYTES # BLD: 0.8 K/MCL (ref 0.3–0.9)
MONOCYTES NFR BLD: 6 %
NEUTROPHILS # BLD: 8 K/MCL (ref 1.8–7.7)
NEUTROPHILS NFR BLD: 66 %
NITRITE UR QL STRIP: NEGATIVE
NRBC BLD MANUAL-RTO: 0 /100 WBC
NT-PROBNP SERPL-MCNC: 3953 PG/ML
OXYHGB MFR BLDV: 34.6 %
OXYHGB MFR BLDV: 66.9 %
P AXIS (DEGREES): 9
PCO2 BLDV: 76 MM HG (ref 41–54)
PCO2 BLDV: 95 MM HG (ref 41–54)
PH BLDV: 7.3 UNITS (ref 7.35–7.45)
PH BLDV: 7.38 UNITS (ref 7.35–7.45)
PH UR STRIP: 7.5 [PH] (ref 5–7)
PLATELET # BLD AUTO: 179 K/MCL (ref 140–450)
PO2 BLDV: 23 MM HG (ref 20–65)
PO2 BLDV: 38 MM HG (ref 20–65)
POTASSIUM BLD-SCNC: 4.3 MMOL/L (ref 3.4–5.1)
POTASSIUM BLD-SCNC: 4.7 MMOL/L (ref 3.4–5.1)
POTASSIUM SERPL-SCNC: 4.5 MMOL/L (ref 3.4–5.1)
PR-INTERVAL (MSEC): 212
PROT SERPL-MCNC: 8.5 G/DL (ref 6.4–8.2)
PROT UR STRIP-MCNC: NEGATIVE MG/DL
PROTHROMBIN TIME: 11.4 SEC (ref 9.7–11.8)
QRS-INTERVAL (MSEC): 96
QT-INTERVAL (MSEC): 342
QTC: 450
R AXIS (DEGREES): -32
RAINBOW EXTRA TUBES HOLD SPECIMEN: NORMAL
RBC # BLD: 3.62 MIL/MCL (ref 4.5–5.9)
RBC #/AREA URNS HPF: ABNORMAL /HPF
REPORT TEXT: NORMAL
SAO2 DF BLDV: 35 % (ref 23–93)
SAO2 DF BLDV: 69 % (ref 23–93)
SODIUM BLD-SCNC: 140 MMOL/L (ref 135–145)
SODIUM BLD-SCNC: 142 MMOL/L (ref 135–145)
SODIUM SERPL-SCNC: 141 MMOL/L (ref 135–145)
SP GR UR STRIP: 1.01 (ref 1–1.03)
SQUAMOUS #/AREA URNS HPF: ABNORMAL /HPF
T AXIS (DEGREES): -31
TROPONIN I SERPL DL<=0.01 NG/ML-MCNC: 23 NG/L
TYPE AND SCREEN EXPIRATION DATE: NORMAL
UROBILINOGEN UR STRIP-MCNC: 0.2 MG/DL
VENTRICULAR RATE EKG/MIN (BPM): 104
WBC # BLD: 12.1 K/MCL (ref 4.2–11)
WBC #/AREA URNS HPF: ABNORMAL /HPF

## 2024-03-03 PROCEDURE — 83880 ASSAY OF NATRIURETIC PEPTIDE: CPT | Performed by: EMERGENCY MEDICINE

## 2024-03-03 PROCEDURE — 84132 ASSAY OF SERUM POTASSIUM: CPT

## 2024-03-03 PROCEDURE — 82805 BLOOD GASES W/O2 SATURATION: CPT

## 2024-03-03 PROCEDURE — 82330 ASSAY OF CALCIUM: CPT

## 2024-03-03 PROCEDURE — 82962 GLUCOSE BLOOD TEST: CPT

## 2024-03-03 PROCEDURE — 10002019 HB COUNTER RESP ASSESSMENT

## 2024-03-03 PROCEDURE — 84484 ASSAY OF TROPONIN QUANT: CPT | Performed by: EMERGENCY MEDICINE

## 2024-03-03 PROCEDURE — 85025 COMPLETE CBC W/AUTO DIFF WBC: CPT | Performed by: EMERGENCY MEDICINE

## 2024-03-03 PROCEDURE — 80053 COMPREHEN METABOLIC PANEL: CPT | Performed by: EMERGENCY MEDICINE

## 2024-03-03 PROCEDURE — 10004180 HB COUNTER-TRANSPORT

## 2024-03-03 PROCEDURE — 10002801 HB RX 250 W/O HCPCS: Performed by: STUDENT IN AN ORGANIZED HEALTH CARE EDUCATION/TRAINING PROGRAM

## 2024-03-03 PROCEDURE — 83605 ASSAY OF LACTIC ACID: CPT | Performed by: EMERGENCY MEDICINE

## 2024-03-03 PROCEDURE — 99221 1ST HOSP IP/OBS SF/LOW 40: CPT

## 2024-03-03 PROCEDURE — 10002803 HB RX 637: Performed by: STUDENT IN AN ORGANIZED HEALTH CARE EDUCATION/TRAINING PROGRAM

## 2024-03-03 PROCEDURE — 99291 CRITICAL CARE FIRST HOUR: CPT | Performed by: STUDENT IN AN ORGANIZED HEALTH CARE EDUCATION/TRAINING PROGRAM

## 2024-03-03 PROCEDURE — 84295 ASSAY OF SERUM SODIUM: CPT

## 2024-03-03 PROCEDURE — 87040 BLOOD CULTURE FOR BACTERIA: CPT | Performed by: EMERGENCY MEDICINE

## 2024-03-03 PROCEDURE — 83605 ASSAY OF LACTIC ACID: CPT

## 2024-03-03 PROCEDURE — 96365 THER/PROPH/DIAG IV INF INIT: CPT

## 2024-03-03 PROCEDURE — 36415 COLL VENOUS BLD VENIPUNCTURE: CPT

## 2024-03-03 PROCEDURE — 93005 ELECTROCARDIOGRAM TRACING: CPT | Performed by: EMERGENCY MEDICINE

## 2024-03-03 PROCEDURE — 96375 TX/PRO/DX INJ NEW DRUG ADDON: CPT

## 2024-03-03 PROCEDURE — 10002800 HB RX 250 W HCPCS: Performed by: STUDENT IN AN ORGANIZED HEALTH CARE EDUCATION/TRAINING PROGRAM

## 2024-03-03 PROCEDURE — 81001 URINALYSIS AUTO W/SCOPE: CPT | Performed by: EMERGENCY MEDICINE

## 2024-03-03 PROCEDURE — 93010 ELECTROCARDIOGRAM REPORT: CPT | Performed by: INTERNAL MEDICINE

## 2024-03-03 PROCEDURE — 10002800 HB RX 250 W HCPCS

## 2024-03-03 PROCEDURE — 86850 RBC ANTIBODY SCREEN: CPT | Performed by: EMERGENCY MEDICINE

## 2024-03-03 PROCEDURE — 85018 HEMOGLOBIN: CPT

## 2024-03-03 PROCEDURE — 94640 AIRWAY INHALATION TREATMENT: CPT

## 2024-03-03 PROCEDURE — 10002807 HB RX 258: Performed by: STUDENT IN AN ORGANIZED HEALTH CARE EDUCATION/TRAINING PROGRAM

## 2024-03-03 PROCEDURE — 10002801 HB RX 250 W/O HCPCS

## 2024-03-03 PROCEDURE — 83735 ASSAY OF MAGNESIUM: CPT | Performed by: EMERGENCY MEDICINE

## 2024-03-03 PROCEDURE — 96372 THER/PROPH/DIAG INJ SC/IM: CPT

## 2024-03-03 PROCEDURE — 10003585 HB ROOM CHARGE INTERMEDIATE CARE

## 2024-03-03 PROCEDURE — 85730 THROMBOPLASTIN TIME PARTIAL: CPT | Performed by: EMERGENCY MEDICINE

## 2024-03-03 PROCEDURE — 87086 URINE CULTURE/COLONY COUNT: CPT | Performed by: EMERGENCY MEDICINE

## 2024-03-03 PROCEDURE — 85610 PROTHROMBIN TIME: CPT | Performed by: EMERGENCY MEDICINE

## 2024-03-03 PROCEDURE — 10002803 HB RX 637

## 2024-03-03 PROCEDURE — 94644 CONT INHLJ TX 1ST HOUR: CPT

## 2024-03-03 PROCEDURE — 99285 EMERGENCY DEPT VISIT HI MDM: CPT

## 2024-03-03 PROCEDURE — 94660 CPAP INITIATION&MGMT: CPT

## 2024-03-03 PROCEDURE — 71045 X-RAY EXAM CHEST 1 VIEW: CPT

## 2024-03-03 RX ORDER — IPRATROPIUM BROMIDE AND ALBUTEROL SULFATE 2.5; .5 MG/3ML; MG/3ML
3 SOLUTION RESPIRATORY (INHALATION)
Status: DISCONTINUED | OUTPATIENT
Start: 2024-03-03 | End: 2024-03-03

## 2024-03-03 RX ORDER — FUROSEMIDE 10 MG/ML
60 INJECTION INTRAMUSCULAR; INTRAVENOUS ONCE
Status: DISCONTINUED | OUTPATIENT
Start: 2024-03-03 | End: 2024-03-03

## 2024-03-03 RX ORDER — PRAVASTATIN SODIUM 40 MG
40 TABLET ORAL NIGHTLY
Status: DISCONTINUED | OUTPATIENT
Start: 2024-03-03 | End: 2024-03-06 | Stop reason: HOSPADM

## 2024-03-03 RX ORDER — METHYLPREDNISOLONE SODIUM SUCCINATE 40 MG/ML
40 INJECTION, POWDER, LYOPHILIZED, FOR SOLUTION INTRAMUSCULAR; INTRAVENOUS ONCE
Status: COMPLETED | OUTPATIENT
Start: 2024-03-03 | End: 2024-03-03

## 2024-03-03 RX ORDER — IPRATROPIUM BROMIDE AND ALBUTEROL SULFATE 2.5; .5 MG/3ML; MG/3ML
3 SOLUTION RESPIRATORY (INHALATION)
Status: DISCONTINUED | OUTPATIENT
Start: 2024-03-03 | End: 2024-03-04

## 2024-03-03 RX ORDER — FLUTICASONE PROPIONATE 50 MCG
SPRAY, SUSPENSION (ML) NASAL
COMMUNITY
End: 2024-03-03

## 2024-03-03 RX ORDER — ENOXAPARIN SODIUM 100 MG/ML
40 INJECTION SUBCUTANEOUS DAILY
Status: DISCONTINUED | OUTPATIENT
Start: 2024-03-04 | End: 2024-03-06 | Stop reason: HOSPADM

## 2024-03-03 RX ORDER — ALBUTEROL SULFATE 2.5 MG/3ML
10 SOLUTION RESPIRATORY (INHALATION) ONCE
Status: COMPLETED | OUTPATIENT
Start: 2024-03-03 | End: 2024-03-03

## 2024-03-03 RX ORDER — NICOTINE POLACRILEX 4 MG
30 LOZENGE BUCCAL PRN
Status: DISCONTINUED | OUTPATIENT
Start: 2024-03-03 | End: 2024-03-06 | Stop reason: HOSPADM

## 2024-03-03 RX ORDER — PREDNISONE 20 MG/1
40 TABLET ORAL
Status: DISCONTINUED | OUTPATIENT
Start: 2024-03-04 | End: 2024-03-03

## 2024-03-03 RX ORDER — ENOXAPARIN SODIUM 100 MG/ML
40 INJECTION SUBCUTANEOUS DAILY
Status: DISCONTINUED | OUTPATIENT
Start: 2024-03-03 | End: 2024-03-03

## 2024-03-03 RX ORDER — ACETAMINOPHEN 325 MG/1
650 TABLET ORAL EVERY 6 HOURS PRN
Status: DISCONTINUED | OUTPATIENT
Start: 2024-03-03 | End: 2024-03-06 | Stop reason: HOSPADM

## 2024-03-03 RX ORDER — METHYLPREDNISOLONE SODIUM SUCCINATE 125 MG/2ML
100 INJECTION, POWDER, LYOPHILIZED, FOR SOLUTION INTRAMUSCULAR; INTRAVENOUS ONCE
Status: DISCONTINUED | OUTPATIENT
Start: 2024-03-03 | End: 2024-03-03

## 2024-03-03 RX ORDER — FUROSEMIDE 40 MG/1
40 TABLET ORAL DAILY
Status: DISCONTINUED | OUTPATIENT
Start: 2024-03-04 | End: 2024-03-06 | Stop reason: HOSPADM

## 2024-03-03 RX ORDER — METHYLPREDNISOLONE SODIUM SUCCINATE 40 MG/ML
40 INJECTION, POWDER, LYOPHILIZED, FOR SOLUTION INTRAMUSCULAR; INTRAVENOUS EVERY 8 HOURS SCHEDULED
Status: DISCONTINUED | OUTPATIENT
Start: 2024-03-03 | End: 2024-03-05

## 2024-03-03 RX ORDER — FUROSEMIDE 10 MG/ML
40 INJECTION INTRAMUSCULAR; INTRAVENOUS ONCE
Status: COMPLETED | OUTPATIENT
Start: 2024-03-03 | End: 2024-03-03

## 2024-03-03 RX ORDER — DEXTROSE MONOHYDRATE 25 G/50ML
12.5 INJECTION, SOLUTION INTRAVENOUS PRN
Status: DISCONTINUED | OUTPATIENT
Start: 2024-03-03 | End: 2024-03-06 | Stop reason: HOSPADM

## 2024-03-03 RX ORDER — IPRATROPIUM BROMIDE 42 UG/1
2 SPRAY, METERED NASAL 4 TIMES DAILY PRN
Status: DISCONTINUED | OUTPATIENT
Start: 2024-03-03 | End: 2024-03-06 | Stop reason: HOSPADM

## 2024-03-03 RX ORDER — MORPHINE SULFATE 15 MG/1
15 TABLET ORAL ONCE
Status: COMPLETED | OUTPATIENT
Start: 2024-03-03 | End: 2024-03-03

## 2024-03-03 RX ORDER — AMOXICILLIN 250 MG
1 CAPSULE ORAL NIGHTLY PRN
Status: DISCONTINUED | OUTPATIENT
Start: 2024-03-03 | End: 2024-03-06 | Stop reason: HOSPADM

## 2024-03-03 RX ORDER — POLYETHYLENE GLYCOL 3350 17 G/17G
17 POWDER, FOR SOLUTION ORAL DAILY
Status: DISCONTINUED | OUTPATIENT
Start: 2024-03-04 | End: 2024-03-06 | Stop reason: HOSPADM

## 2024-03-03 RX ORDER — AZITHROMYCIN 250 MG/1
500 TABLET, FILM COATED ORAL DAILY
Status: COMPLETED | OUTPATIENT
Start: 2024-03-04 | End: 2024-03-05

## 2024-03-03 RX ORDER — FERROUS SULFATE 325(65) MG
335 TABLET ORAL DAILY
Status: DISCONTINUED | OUTPATIENT
Start: 2024-03-04 | End: 2024-03-06 | Stop reason: HOSPADM

## 2024-03-03 RX ORDER — DEXTROSE MONOHYDRATE 25 G/50ML
25 INJECTION, SOLUTION INTRAVENOUS PRN
Status: DISCONTINUED | OUTPATIENT
Start: 2024-03-03 | End: 2024-03-06 | Stop reason: HOSPADM

## 2024-03-03 RX ORDER — PANTOPRAZOLE SODIUM 40 MG/1
40 TABLET, DELAYED RELEASE ORAL
Status: DISCONTINUED | OUTPATIENT
Start: 2024-03-04 | End: 2024-03-06 | Stop reason: HOSPADM

## 2024-03-03 RX ORDER — OMEPRAZOLE 40 MG/1
1 CAPSULE, DELAYED RELEASE ORAL DAILY
COMMUNITY

## 2024-03-03 RX ORDER — IPRATROPIUM BROMIDE 42 UG/1
2 SPRAY, METERED NASAL 4 TIMES DAILY
COMMUNITY

## 2024-03-03 RX ORDER — NICOTINE POLACRILEX 4 MG
15 LOZENGE BUCCAL PRN
Status: DISCONTINUED | OUTPATIENT
Start: 2024-03-03 | End: 2024-03-06 | Stop reason: HOSPADM

## 2024-03-03 RX ORDER — BLOOD SUGAR DIAGNOSTIC
STRIP MISCELLANEOUS
COMMUNITY

## 2024-03-03 RX ORDER — HYDROCODONE BITARTRATE AND ACETAMINOPHEN 5; 325 MG/1; MG/1
1 TABLET ORAL EVERY 4 HOURS PRN
Status: DISCONTINUED | OUTPATIENT
Start: 2024-03-03 | End: 2024-03-04

## 2024-03-03 RX ORDER — HYDROCODONE BITARTRATE AND ACETAMINOPHEN 5; 325 MG/1; MG/1
1 TABLET ORAL 2 TIMES DAILY PRN
COMMUNITY

## 2024-03-03 RX ORDER — INDOMETHACIN 75 MG/1
75 CAPSULE, EXTENDED RELEASE ORAL DAILY PRN
COMMUNITY

## 2024-03-03 RX ORDER — LANCETS 33 GAUGE
EACH MISCELLANEOUS
COMMUNITY

## 2024-03-03 RX ADMIN — IPRATROPIUM BROMIDE 0.5 MG: 0.5 SOLUTION RESPIRATORY (INHALATION) at 10:58

## 2024-03-03 RX ADMIN — IPRATROPIUM BROMIDE AND ALBUTEROL SULFATE 3 ML: 2.5; .5 SOLUTION RESPIRATORY (INHALATION) at 16:15

## 2024-03-03 RX ADMIN — FUROSEMIDE 40 MG: 10 INJECTION, SOLUTION INTRAMUSCULAR; INTRAVENOUS at 13:05

## 2024-03-03 RX ADMIN — HYDROCODONE BITARTRATE AND ACETAMINOPHEN 1 TABLET: 5; 325 TABLET ORAL at 20:48

## 2024-03-03 RX ADMIN — ALBUTEROL SULFATE 10 MG: 2.5 SOLUTION RESPIRATORY (INHALATION) at 10:58

## 2024-03-03 RX ADMIN — AZITHROMYCIN DIHYDRATE 500 MG: 500 INJECTION, POWDER, LYOPHILIZED, FOR SOLUTION INTRAVENOUS at 13:05

## 2024-03-03 RX ADMIN — METHYLPREDNISOLONE SODIUM SUCCINATE 40 MG: 40 INJECTION, POWDER, FOR SOLUTION INTRAMUSCULAR; INTRAVENOUS at 21:40

## 2024-03-03 RX ADMIN — INSULIN LISPRO 2 UNITS: 100 INJECTION, SOLUTION INTRAVENOUS; SUBCUTANEOUS at 20:51

## 2024-03-03 RX ADMIN — METHYLPREDNISOLONE SODIUM SUCCINATE 40 MG: 40 INJECTION, POWDER, FOR SOLUTION INTRAMUSCULAR; INTRAVENOUS at 13:04

## 2024-03-03 RX ADMIN — IPRATROPIUM BROMIDE AND ALBUTEROL SULFATE 3 ML: 2.5; .5 SOLUTION RESPIRATORY (INHALATION) at 19:09

## 2024-03-03 RX ADMIN — MORPHINE SULFATE 15 MG: 15 TABLET ORAL at 13:05

## 2024-03-03 SDOH — SOCIAL STABILITY: SOCIAL NETWORK: SUPPORT SYSTEMS: SPOUSE;CHILDREN

## 2024-03-03 SDOH — ECONOMIC STABILITY: FOOD INSECURITY: WITHIN THE PAST 12 MONTHS, THE FOOD YOU BOUGHT JUST DIDN'T LAST AND YOU DIDN'T HAVE MONEY TO GET MORE.: NEVER TRUE

## 2024-03-03 SDOH — HEALTH STABILITY: GENERAL: BECAUSE OF A PHYSICAL, MENTAL, OR EMOTIONAL CONDITION, DO YOU HAVE DIFFICULTY DOING ERRANDS ALONE?: YES

## 2024-03-03 SDOH — ECONOMIC STABILITY: INCOME INSECURITY: IN THE PAST 12 MONTHS, HAS THE ELECTRIC, GAS, OIL, OR WATER COMPANY THREATENED TO SHUT OFF SERVICE IN YOUR HOME?: NO

## 2024-03-03 SDOH — ECONOMIC STABILITY: HOUSING INSECURITY: WHAT IS YOUR LIVING SITUATION TODAY?: HOUSE

## 2024-03-03 SDOH — ECONOMIC STABILITY: GENERAL: WOULD YOU LIKE HELP WITH ANY OF THE FOLLOWING NEEDS?: I DON'T WANT HELP WITH ANY OF THESE

## 2024-03-03 SDOH — ECONOMIC STABILITY: HOUSING INSECURITY: WHAT IS YOUR LIVING SITUATION TODAY?: I HAVE A STEADY PLACE TO LIVE

## 2024-03-03 SDOH — ECONOMIC STABILITY: FOOD INSECURITY
WITHIN THE PAST 12 MONTHS, THE FOOD YOU BOUGHT JUST DIDN'T LAST AND YOU DIDN'T HAVE MONEY TO GET MORE.: PATIENT UNABLE TO ANSWER

## 2024-03-03 SDOH — HEALTH STABILITY: PHYSICAL HEALTH: DO YOU HAVE SERIOUS DIFFICULTY WALKING OR CLIMBING STAIRS?: YES

## 2024-03-03 SDOH — SOCIAL STABILITY: SOCIAL INSECURITY: HOW OFTEN DOES ANYONE, INCLUDING FAMILY AND FRIENDS, THREATEN YOU WITH HARM?: NEVER

## 2024-03-03 SDOH — HEALTH STABILITY: PHYSICAL HEALTH: DO YOU HAVE DIFFICULTY DRESSING OR BATHING?: YES

## 2024-03-03 SDOH — SOCIAL STABILITY: SOCIAL INSECURITY: HOW OFTEN DOES ANYONE, INCLUDING FAMILY AND FRIENDS, INSULT OR TALK DOWN TO YOU?: NEVER

## 2024-03-03 SDOH — SOCIAL STABILITY: SOCIAL INSECURITY: HOW OFTEN DOES ANYONE, INCLUDING FAMILY AND FRIENDS, PHYSICALLY HURT YOU?: NEVER

## 2024-03-03 SDOH — ECONOMIC STABILITY: HOUSING INSECURITY: DO YOU HAVE PROBLEMS WITH ANY OF THE FOLLOWING?: NONE OF THE ABOVE

## 2024-03-03 SDOH — SOCIAL STABILITY: SOCIAL INSECURITY: HOW OFTEN DOES ANYONE, INCLUDING FAMILY AND FRIENDS, SCREAM OR CURSE AT YOU?: NEVER

## 2024-03-03 SDOH — ECONOMIC STABILITY: GENERAL

## 2024-03-03 SDOH — ECONOMIC STABILITY: HOUSING INSECURITY: WHAT IS YOUR LIVING SITUATION TODAY?: SPOUSE

## 2024-03-03 SDOH — ECONOMIC STABILITY: TRANSPORTATION INSECURITY
IN THE PAST 12 MONTHS, HAS LACK OF RELIABLE TRANSPORTATION KEPT YOU FROM MEDICAL APPOINTMENTS, MEETINGS, WORK OR FROM GETTING THINGS NEEDED FOR DAILY LIVING?: NO

## 2024-03-03 ASSESSMENT — ACTIVITIES OF DAILY LIVING (ADL)
RECENT_DECLINE_ADL: YES, ACUTE ILLNESS WITHOUT THERAPY NEEDS
FEEDING: INDEPENDENT
DRESSING: NEEDS ASSISTANCE
ADL_BEFORE_ADMISSION: NEEDS/REQUIRES ASSISTANCE
ADL_SHORT_OF_BREATH: YES
BATHING: NEEDS ASSISTANCE
TOILETING: INDEPENDENT
ADL_SCORE: 9

## 2024-03-03 ASSESSMENT — ORIENTATION MEMORY CONCENTRATION TEST (OMCT)
OMCT INTERPRETATION: 0-6: NO SIGNIFICANT IMPAIRMENT
SAY THE MONTHS IN REVERSE ORDER STARTING WITH LAST MONTH: CORRECT
COUNT BACKWARDS FROM 20 TO 1: CORRECT
REPEAT THE NAME AND ADDRESS I ASKED YOU TO REMEMBER: CORRECT
OMCT SCORE: 0
WHAT YEAR IS IT NOW (MUST BE EXACT): CORRECT
WHAT TIME IS IT (NO WATCH OR CLOCK): CORRECT
WHAT MONTH IS IT NOW: CORRECT

## 2024-03-03 ASSESSMENT — LIFESTYLE VARIABLES
ALCOHOL_USE_STATUS: NO OR LOW RISK WITH VALIDATED TOOL
AUDIT-C TOTAL SCORE: 0
HOW OFTEN DO YOU HAVE A DRINK CONTAINING ALCOHOL: NEVER
HOW MANY STANDARD DRINKS CONTAINING ALCOHOL DO YOU HAVE ON A TYPICAL DAY: 0,1 OR 2
HOW OFTEN DO YOU HAVE 6 OR MORE DRINKS ON ONE OCCASION: NEVER

## 2024-03-03 ASSESSMENT — COLUMBIA-SUICIDE SEVERITY RATING SCALE - C-SSRS
IS THE PATIENT ABLE TO COMPLETE C-SSRS: YES
6. HAVE YOU EVER DONE ANYTHING, STARTED TO DO ANYTHING, OR PREPARED TO DO ANYTHING TO END YOUR LIFE?: NO
2. HAVE YOU ACTUALLY HAD ANY THOUGHTS OF KILLING YOURSELF?: NO
1. WITHIN THE PAST MONTH, HAVE YOU WISHED YOU WERE DEAD OR WISHED YOU COULD GO TO SLEEP AND NOT WAKE UP?: NO

## 2024-03-03 ASSESSMENT — PATIENT HEALTH QUESTIONNAIRE - PHQ9
SUM OF ALL RESPONSES TO PHQ9 QUESTIONS 1 AND 2: 0
IS PATIENT ABLE TO COMPLETE PHQ2 OR PHQ9: YES
1. LITTLE INTEREST OR PLEASURE IN DOING THINGS: NOT AT ALL
2. FEELING DOWN, DEPRESSED OR HOPELESS: NOT AT ALL
CLINICAL INTERPRETATION OF PHQ2 SCORE: NO FURTHER SCREENING NEEDED
SUM OF ALL RESPONSES TO PHQ9 QUESTIONS 1 AND 2: 0

## 2024-03-03 ASSESSMENT — PAIN SCALES - GENERAL: PAINLEVEL_OUTOF10: 5

## 2024-03-04 ENCOUNTER — TRANSCRIBE ORDERS (OUTPATIENT)
Dept: EMERGENCY MEDICINE | Age: 79
End: 2024-03-04

## 2024-03-04 DIAGNOSIS — R69 ILL-DEFINED CONDITION: Primary | ICD-10-CM

## 2024-03-04 LAB
ANION GAP SERPL CALC-SCNC: 6 MMOL/L (ref 7–19)
BACTERIA UR CULT: NO GROWTH
BASOPHILS # BLD: 0 K/MCL (ref 0–0.3)
BASOPHILS NFR BLD: 0 %
BUN SERPL-MCNC: 27 MG/DL (ref 6–20)
BUN/CREAT SERPL: 31 (ref 7–25)
CALCIUM SERPL-MCNC: 9.3 MG/DL (ref 8.4–10.2)
CHLORIDE SERPL-SCNC: 100 MMOL/L (ref 97–110)
CO2 SERPL-SCNC: 39 MMOL/L (ref 21–32)
CREAT SERPL-MCNC: 0.86 MG/DL (ref 0.67–1.17)
DEPRECATED RDW RBC: 45.5 FL (ref 39–50)
EGFRCR SERPLBLD CKD-EPI 2021: 88 ML/MIN/{1.73_M2}
EOSINOPHIL # BLD: 0 K/MCL (ref 0–0.5)
EOSINOPHIL NFR BLD: 0 %
ERYTHROCYTE [DISTWIDTH] IN BLOOD: 13.1 % (ref 11–15)
FASTING DURATION TIME PATIENT: ABNORMAL H
GLUCOSE BLDC GLUCOMTR-MCNC: 204 MG/DL (ref 70–99)
GLUCOSE BLDC GLUCOMTR-MCNC: 208 MG/DL (ref 70–99)
GLUCOSE BLDC GLUCOMTR-MCNC: 224 MG/DL (ref 70–99)
GLUCOSE BLDC GLUCOMTR-MCNC: 264 MG/DL (ref 70–99)
GLUCOSE BLDC GLUCOMTR-MCNC: 310 MG/DL (ref 70–99)
GLUCOSE SERPL-MCNC: 231 MG/DL (ref 70–99)
HCT VFR BLD CALC: 31.8 % (ref 39–51)
HGB BLD-MCNC: 9.5 G/DL (ref 13–17)
IMM GRANULOCYTES # BLD AUTO: 0 K/MCL (ref 0–0.2)
IMM GRANULOCYTES # BLD: 1 %
LACTATE BLDV-SCNC: 1 MMOL/L (ref 0–2)
LYMPHOCYTES # BLD: 0.7 K/MCL (ref 1–4)
LYMPHOCYTES NFR BLD: 10 %
MAGNESIUM SERPL-MCNC: 2.3 MG/DL (ref 1.7–2.4)
MCH RBC QN AUTO: 28.5 PG (ref 26–34)
MCHC RBC AUTO-ENTMCNC: 29.9 G/DL (ref 32–36.5)
MCV RBC AUTO: 95.5 FL (ref 78–100)
MONOCYTES # BLD: 0.1 K/MCL (ref 0.3–0.9)
MONOCYTES NFR BLD: 1 %
NEUTROPHILS # BLD: 6.7 K/MCL (ref 1.8–7.7)
NEUTROPHILS NFR BLD: 88 %
NRBC BLD MANUAL-RTO: 0 /100 WBC
PLATELET # BLD AUTO: 161 K/MCL (ref 140–450)
POTASSIUM SERPL-SCNC: 4.5 MMOL/L (ref 3.4–5.1)
RBC # BLD: 3.33 MIL/MCL (ref 4.5–5.9)
SODIUM SERPL-SCNC: 140 MMOL/L (ref 135–145)
WBC # BLD: 7.6 K/MCL (ref 4.2–11)

## 2024-03-04 PROCEDURE — 36415 COLL VENOUS BLD VENIPUNCTURE: CPT | Performed by: STUDENT IN AN ORGANIZED HEALTH CARE EDUCATION/TRAINING PROGRAM

## 2024-03-04 PROCEDURE — 85025 COMPLETE CBC W/AUTO DIFF WBC: CPT

## 2024-03-04 PROCEDURE — 94660 CPAP INITIATION&MGMT: CPT

## 2024-03-04 PROCEDURE — 96372 THER/PROPH/DIAG INJ SC/IM: CPT | Performed by: STUDENT IN AN ORGANIZED HEALTH CARE EDUCATION/TRAINING PROGRAM

## 2024-03-04 PROCEDURE — 99291 CRITICAL CARE FIRST HOUR: CPT | Performed by: STUDENT IN AN ORGANIZED HEALTH CARE EDUCATION/TRAINING PROGRAM

## 2024-03-04 PROCEDURE — 80048 BASIC METABOLIC PNL TOTAL CA: CPT

## 2024-03-04 PROCEDURE — 10002803 HB RX 637

## 2024-03-04 PROCEDURE — 82962 GLUCOSE BLOOD TEST: CPT

## 2024-03-04 PROCEDURE — 83605 ASSAY OF LACTIC ACID: CPT | Performed by: STUDENT IN AN ORGANIZED HEALTH CARE EDUCATION/TRAINING PROGRAM

## 2024-03-04 PROCEDURE — 99233 SBSQ HOSP IP/OBS HIGH 50: CPT

## 2024-03-04 PROCEDURE — 10002800 HB RX 250 W HCPCS: Performed by: STUDENT IN AN ORGANIZED HEALTH CARE EDUCATION/TRAINING PROGRAM

## 2024-03-04 PROCEDURE — 99497 ADVNCD CARE PLAN 30 MIN: CPT | Performed by: INTERNAL MEDICINE

## 2024-03-04 PROCEDURE — 99223 1ST HOSP IP/OBS HIGH 75: CPT | Performed by: INTERNAL MEDICINE

## 2024-03-04 PROCEDURE — 96372 THER/PROPH/DIAG INJ SC/IM: CPT

## 2024-03-04 PROCEDURE — 10002800 HB RX 250 W HCPCS

## 2024-03-04 PROCEDURE — 83735 ASSAY OF MAGNESIUM: CPT

## 2024-03-04 PROCEDURE — 10002801 HB RX 250 W/O HCPCS

## 2024-03-04 PROCEDURE — 10004180 HB COUNTER-TRANSPORT

## 2024-03-04 PROCEDURE — 10006031 HB ROOM CHARGE TELEMETRY

## 2024-03-04 RX ORDER — ACETAZOLAMIDE 500 MG/5ML
250 INJECTION, POWDER, LYOPHILIZED, FOR SOLUTION INTRAVENOUS EVERY 12 HOURS
Status: DISCONTINUED | OUTPATIENT
Start: 2024-03-04 | End: 2024-03-06 | Stop reason: HOSPADM

## 2024-03-04 RX ORDER — PREDNISONE 20 MG/1
60 TABLET ORAL
Qty: 9 TABLET | Refills: 0 | Status: SHIPPED | OUTPATIENT
Start: 2024-03-06 | End: 2024-03-06 | Stop reason: HOSPADM

## 2024-03-04 RX ORDER — HYDROCODONE BITARTRATE AND ACETAMINOPHEN 5; 325 MG/1; MG/1
1 TABLET ORAL EVERY 6 HOURS
Status: DISCONTINUED | OUTPATIENT
Start: 2024-03-04 | End: 2024-03-06 | Stop reason: HOSPADM

## 2024-03-04 RX ORDER — AZITHROMYCIN 500 MG/1
500 TABLET, FILM COATED ORAL DAILY
Qty: 1 TABLET | Refills: 0 | Status: SHIPPED | OUTPATIENT
Start: 2024-03-06 | End: 2024-03-06 | Stop reason: HOSPADM

## 2024-03-04 RX ORDER — ALBUTEROL SULFATE 90 UG/1
2 AEROSOL, METERED RESPIRATORY (INHALATION)
Status: DISCONTINUED | OUTPATIENT
Start: 2024-03-04 | End: 2024-03-06 | Stop reason: HOSPADM

## 2024-03-04 RX ORDER — IPRATROPIUM BROMIDE AND ALBUTEROL SULFATE 2.5; .5 MG/3ML; MG/3ML
3 SOLUTION RESPIRATORY (INHALATION)
Status: DISCONTINUED | OUTPATIENT
Start: 2024-03-04 | End: 2024-03-04

## 2024-03-04 RX ORDER — FUROSEMIDE 40 MG/1
40 TABLET ORAL DAILY
Qty: 30 TABLET | Refills: 0 | Status: SHIPPED | OUTPATIENT
Start: 2024-03-05

## 2024-03-04 RX ADMIN — METHYLPREDNISOLONE SODIUM SUCCINATE 40 MG: 40 INJECTION, POWDER, FOR SOLUTION INTRAMUSCULAR; INTRAVENOUS at 06:14

## 2024-03-04 RX ADMIN — AZITHROMYCIN DIHYDRATE 500 MG: 250 TABLET ORAL at 08:52

## 2024-03-04 RX ADMIN — PRAVASTATIN SODIUM 40 MG: 40 TABLET ORAL at 21:37

## 2024-03-04 RX ADMIN — LABETALOL HYDROCHLORIDE 5 MG: 5 INJECTION, SOLUTION INTRAVENOUS at 11:58

## 2024-03-04 RX ADMIN — MUPIROCIN: 20 OINTMENT TOPICAL at 09:34

## 2024-03-04 RX ADMIN — HYDROCODONE BITARTRATE AND ACETAMINOPHEN 1 TABLET: 5; 325 TABLET ORAL at 18:23

## 2024-03-04 RX ADMIN — MUPIROCIN: 20 OINTMENT TOPICAL at 21:37

## 2024-03-04 RX ADMIN — FERROUS SULFATE TAB 325 MG (65 MG ELEMENTAL FE) 325 MG: 325 (65 FE) TAB at 08:52

## 2024-03-04 RX ADMIN — INSULIN LISPRO 4 UNITS: 100 INJECTION, SOLUTION INTRAVENOUS; SUBCUTANEOUS at 12:52

## 2024-03-04 RX ADMIN — FUROSEMIDE 40 MG: 40 TABLET ORAL at 08:52

## 2024-03-04 RX ADMIN — HYDROCODONE BITARTRATE AND ACETAMINOPHEN 1 TABLET: 5; 325 TABLET ORAL at 23:56

## 2024-03-04 RX ADMIN — METHYLPREDNISOLONE SODIUM SUCCINATE 40 MG: 40 INJECTION, POWDER, FOR SOLUTION INTRAMUSCULAR; INTRAVENOUS at 20:49

## 2024-03-04 RX ADMIN — IPRATROPIUM BROMIDE AND ALBUTEROL SULFATE 3 ML: 2.5; .5 SOLUTION RESPIRATORY (INHALATION) at 14:50

## 2024-03-04 RX ADMIN — POLYETHYLENE GLYCOL (3350) 17 G: 17 POWDER, FOR SOLUTION ORAL at 08:52

## 2024-03-04 RX ADMIN — INSULIN LISPRO 6 UNITS: 100 INJECTION, SOLUTION INTRAVENOUS; SUBCUTANEOUS at 20:49

## 2024-03-04 RX ADMIN — MUPIROCIN: 20 OINTMENT TOPICAL at 14:16

## 2024-03-04 RX ADMIN — HYDROCODONE BITARTRATE AND ACETAMINOPHEN 1 TABLET: 5; 325 TABLET ORAL at 06:14

## 2024-03-04 RX ADMIN — PANTOPRAZOLE 40 MG: 40 TABLET, DELAYED RELEASE ORAL at 06:14

## 2024-03-04 RX ADMIN — INSULIN LISPRO 4 UNITS: 100 INJECTION, SOLUTION INTRAVENOUS; SUBCUTANEOUS at 08:52

## 2024-03-04 RX ADMIN — METHYLPREDNISOLONE SODIUM SUCCINATE 40 MG: 40 INJECTION, POWDER, FOR SOLUTION INTRAMUSCULAR; INTRAVENOUS at 14:16

## 2024-03-04 RX ADMIN — HYDROCODONE BITARTRATE AND ACETAMINOPHEN 1 TABLET: 5; 325 TABLET ORAL at 12:51

## 2024-03-04 RX ADMIN — INSULIN LISPRO 8 UNITS: 100 INJECTION, SOLUTION INTRAVENOUS; SUBCUTANEOUS at 18:23

## 2024-03-04 RX ADMIN — ENOXAPARIN SODIUM 40 MG: 100 INJECTION SUBCUTANEOUS at 08:52

## 2024-03-04 SDOH — ECONOMIC STABILITY: HOUSING INSECURITY: WHAT IS YOUR LIVING SITUATION TODAY?: I HAVE A STEADY PLACE TO LIVE

## 2024-03-04 SDOH — ECONOMIC STABILITY: GENERAL

## 2024-03-04 ASSESSMENT — COGNITIVE AND FUNCTIONAL STATUS - GENERAL
DO YOU HAVE DIFFICULTY DRESSING OR BATHING: YES
BECAUSE OF A PHYSICAL, MENTAL, OR EMOTIONAL CONDITION, DO YOU HAVE DIFFICULTY DOING ERRANDS ALONE: YES
BECAUSE OF A PHYSICAL, MENTAL, OR EMOTIONAL CONDITION, DO YOU HAVE SERIOUS DIFFICULTY CONCENTRATING, REMEMBERING OR MAKING DECISIONS: NO

## 2024-03-04 ASSESSMENT — ENCOUNTER SYMPTOMS
ACTIVITY CHANGE: 1
EYES NEGATIVE: 1
UNEXPECTED WEIGHT CHANGE: 0
SHORTNESS OF BREATH: 1
ABDOMINAL PAIN: 0
PSYCHIATRIC NEGATIVE: 1
FATIGUE: 1
BACK PAIN: 1
HEMATOLOGIC/LYMPHATIC NEGATIVE: 1
ABDOMINAL DISTENTION: 0
ALLERGIC/IMMUNOLOGIC NEGATIVE: 1

## 2024-03-04 ASSESSMENT — PAIN SCALES - GENERAL
PAINLEVEL_OUTOF10: 5
PAINLEVEL_OUTOF10: 4
PAINLEVEL_OUTOF10: 0
PAINLEVEL_OUTOF10: 0
PAINLEVEL_OUTOF10: 4
PAINLEVEL_OUTOF10: 0
PAINLEVEL_OUTOF10: 5
PAINLEVEL_OUTOF10: 0

## 2024-03-05 LAB
ANION GAP SERPL CALC-SCNC: 7 MMOL/L (ref 7–19)
BASOPHILS # BLD: 0 K/MCL (ref 0–0.3)
BASOPHILS NFR BLD: 0 %
BUN SERPL-MCNC: 32 MG/DL (ref 6–20)
BUN/CREAT SERPL: 33 (ref 7–25)
CALCIUM SERPL-MCNC: 9.7 MG/DL (ref 8.4–10.2)
CHLORIDE SERPL-SCNC: 98 MMOL/L (ref 97–110)
CO2 SERPL-SCNC: 39 MMOL/L (ref 21–32)
CREAT SERPL-MCNC: 0.98 MG/DL (ref 0.67–1.17)
DEPRECATED RDW RBC: 45.1 FL (ref 39–50)
EGFRCR SERPLBLD CKD-EPI 2021: 78 ML/MIN/{1.73_M2}
EOSINOPHIL # BLD: 0 K/MCL (ref 0–0.5)
EOSINOPHIL NFR BLD: 0 %
ERYTHROCYTE [DISTWIDTH] IN BLOOD: 13.1 % (ref 11–15)
FASTING DURATION TIME PATIENT: ABNORMAL H
GLUCOSE BLDC GLUCOMTR-MCNC: 172 MG/DL (ref 70–99)
GLUCOSE BLDC GLUCOMTR-MCNC: 173 MG/DL (ref 70–99)
GLUCOSE BLDC GLUCOMTR-MCNC: 180 MG/DL (ref 70–99)
GLUCOSE BLDC GLUCOMTR-MCNC: 206 MG/DL (ref 70–99)
GLUCOSE BLDC GLUCOMTR-MCNC: 280 MG/DL (ref 70–99)
GLUCOSE SERPL-MCNC: 190 MG/DL (ref 70–99)
HCT VFR BLD CALC: 35.5 % (ref 39–51)
HGB BLD-MCNC: 10.8 G/DL (ref 13–17)
IMM GRANULOCYTES # BLD AUTO: 0.1 K/MCL (ref 0–0.2)
IMM GRANULOCYTES # BLD: 1 %
LYMPHOCYTES # BLD: 1 K/MCL (ref 1–4)
LYMPHOCYTES NFR BLD: 8 %
MAGNESIUM SERPL-MCNC: 2.2 MG/DL (ref 1.7–2.4)
MCH RBC QN AUTO: 28.7 PG (ref 26–34)
MCHC RBC AUTO-ENTMCNC: 30.4 G/DL (ref 32–36.5)
MCV RBC AUTO: 94.4 FL (ref 78–100)
MONOCYTES # BLD: 0.2 K/MCL (ref 0.3–0.9)
MONOCYTES NFR BLD: 2 %
NEUTROPHILS # BLD: 10.6 K/MCL (ref 1.8–7.7)
NEUTROPHILS NFR BLD: 89 %
NRBC BLD MANUAL-RTO: 0 /100 WBC
PLATELET # BLD AUTO: 202 K/MCL (ref 140–450)
POTASSIUM SERPL-SCNC: 4.3 MMOL/L (ref 3.4–5.1)
RBC # BLD: 3.76 MIL/MCL (ref 4.5–5.9)
SODIUM SERPL-SCNC: 140 MMOL/L (ref 135–145)
WBC # BLD: 11.8 K/MCL (ref 4.2–11)

## 2024-03-05 PROCEDURE — 83735 ASSAY OF MAGNESIUM: CPT

## 2024-03-05 PROCEDURE — 10002800 HB RX 250 W HCPCS

## 2024-03-05 PROCEDURE — 94660 CPAP INITIATION&MGMT: CPT

## 2024-03-05 PROCEDURE — 85025 COMPLETE CBC W/AUTO DIFF WBC: CPT

## 2024-03-05 PROCEDURE — 10002800 HB RX 250 W HCPCS: Performed by: STUDENT IN AN ORGANIZED HEALTH CARE EDUCATION/TRAINING PROGRAM

## 2024-03-05 PROCEDURE — 96372 THER/PROPH/DIAG INJ SC/IM: CPT | Performed by: STUDENT IN AN ORGANIZED HEALTH CARE EDUCATION/TRAINING PROGRAM

## 2024-03-05 PROCEDURE — 36415 COLL VENOUS BLD VENIPUNCTURE: CPT

## 2024-03-05 PROCEDURE — 10002803 HB RX 637

## 2024-03-05 PROCEDURE — 96372 THER/PROPH/DIAG INJ SC/IM: CPT

## 2024-03-05 PROCEDURE — 80048 BASIC METABOLIC PNL TOTAL CA: CPT

## 2024-03-05 PROCEDURE — 10006031 HB ROOM CHARGE TELEMETRY

## 2024-03-05 PROCEDURE — 99233 SBSQ HOSP IP/OBS HIGH 50: CPT

## 2024-03-05 RX ORDER — METHYLPREDNISOLONE SODIUM SUCCINATE 125 MG/2ML
60 INJECTION, POWDER, LYOPHILIZED, FOR SOLUTION INTRAMUSCULAR; INTRAVENOUS DAILY
Status: DISCONTINUED | OUTPATIENT
Start: 2024-03-06 | End: 2024-03-05

## 2024-03-05 RX ADMIN — ENOXAPARIN SODIUM 40 MG: 100 INJECTION SUBCUTANEOUS at 08:34

## 2024-03-05 RX ADMIN — AZITHROMYCIN DIHYDRATE 500 MG: 250 TABLET ORAL at 08:34

## 2024-03-05 RX ADMIN — METHYLPREDNISOLONE SODIUM SUCCINATE 40 MG: 40 INJECTION, POWDER, FOR SOLUTION INTRAMUSCULAR; INTRAVENOUS at 05:45

## 2024-03-05 RX ADMIN — MUPIROCIN: 20 OINTMENT TOPICAL at 13:54

## 2024-03-05 RX ADMIN — HYDROCODONE BITARTRATE AND ACETAMINOPHEN 1 TABLET: 5; 325 TABLET ORAL at 05:45

## 2024-03-05 RX ADMIN — FERROUS SULFATE TAB 325 MG (65 MG ELEMENTAL FE) 325 MG: 325 (65 FE) TAB at 08:34

## 2024-03-05 RX ADMIN — PRAVASTATIN SODIUM 40 MG: 40 TABLET ORAL at 20:59

## 2024-03-05 RX ADMIN — MUPIROCIN: 20 OINTMENT TOPICAL at 20:59

## 2024-03-05 RX ADMIN — HYDROCODONE BITARTRATE AND ACETAMINOPHEN 1 TABLET: 5; 325 TABLET ORAL at 23:19

## 2024-03-05 RX ADMIN — HYDROCODONE BITARTRATE AND ACETAMINOPHEN 1 TABLET: 5; 325 TABLET ORAL at 17:11

## 2024-03-05 RX ADMIN — INSULIN LISPRO 6 UNITS: 100 INJECTION, SOLUTION INTRAVENOUS; SUBCUTANEOUS at 12:13

## 2024-03-05 RX ADMIN — HYDROCODONE BITARTRATE AND ACETAMINOPHEN 1 TABLET: 5; 325 TABLET ORAL at 12:11

## 2024-03-05 RX ADMIN — FUROSEMIDE 40 MG: 40 TABLET ORAL at 08:34

## 2024-03-05 RX ADMIN — PANTOPRAZOLE 40 MG: 40 TABLET, DELAYED RELEASE ORAL at 05:46

## 2024-03-05 RX ADMIN — INSULIN LISPRO 2 UNITS: 100 INJECTION, SOLUTION INTRAVENOUS; SUBCUTANEOUS at 17:12

## 2024-03-05 RX ADMIN — INSULIN LISPRO 2 UNITS: 100 INJECTION, SOLUTION INTRAVENOUS; SUBCUTANEOUS at 08:36

## 2024-03-05 RX ADMIN — INSULIN LISPRO 4 UNITS: 100 INJECTION, SOLUTION INTRAVENOUS; SUBCUTANEOUS at 21:00

## 2024-03-05 RX ADMIN — MUPIROCIN: 20 OINTMENT TOPICAL at 08:34

## 2024-03-05 ASSESSMENT — PAIN SCALES - GENERAL
PAINLEVEL_OUTOF10: 0
PAINLEVEL_OUTOF10: 5
PAINLEVEL_OUTOF10: 4
PAINLEVEL_OUTOF10: 5
PAINLEVEL_OUTOF10: 0
PAINLEVEL_OUTOF10: 0
PAINLEVEL_OUTOF10: 3

## 2024-03-06 VITALS
HEART RATE: 62 BPM | HEIGHT: 61 IN | OXYGEN SATURATION: 99 % | RESPIRATION RATE: 18 BRPM | WEIGHT: 205.03 LBS | SYSTOLIC BLOOD PRESSURE: 133 MMHG | DIASTOLIC BLOOD PRESSURE: 79 MMHG | BODY MASS INDEX: 38.71 KG/M2 | TEMPERATURE: 98.1 F

## 2024-03-06 LAB
ANION GAP SERPL CALC-SCNC: 5 MMOL/L (ref 7–19)
BASOPHILS # BLD: 0 K/MCL (ref 0–0.3)
BASOPHILS NFR BLD: 0 %
BUN SERPL-MCNC: 32 MG/DL (ref 6–20)
BUN/CREAT SERPL: 34 (ref 7–25)
CALCIUM SERPL-MCNC: 9.3 MG/DL (ref 8.4–10.2)
CHLORIDE SERPL-SCNC: 103 MMOL/L (ref 97–110)
CO2 SERPL-SCNC: 37 MMOL/L (ref 21–32)
CREAT SERPL-MCNC: 0.94 MG/DL (ref 0.67–1.17)
DEPRECATED RDW RBC: 44.6 FL (ref 39–50)
EGFRCR SERPLBLD CKD-EPI 2021: 82 ML/MIN/{1.73_M2}
EOSINOPHIL # BLD: 0 K/MCL (ref 0–0.5)
EOSINOPHIL NFR BLD: 0 %
ERYTHROCYTE [DISTWIDTH] IN BLOOD: 13.2 % (ref 11–15)
FASTING DURATION TIME PATIENT: ABNORMAL H
GLUCOSE BLDC GLUCOMTR-MCNC: 109 MG/DL (ref 70–99)
GLUCOSE BLDC GLUCOMTR-MCNC: 179 MG/DL (ref 70–99)
GLUCOSE SERPL-MCNC: 108 MG/DL (ref 70–99)
HCT VFR BLD CALC: 33.2 % (ref 39–51)
HGB BLD-MCNC: 10.4 G/DL (ref 13–17)
IMM GRANULOCYTES # BLD AUTO: 0 K/MCL (ref 0–0.2)
IMM GRANULOCYTES # BLD: 0 %
LYMPHOCYTES # BLD: 2 K/MCL (ref 1–4)
LYMPHOCYTES NFR BLD: 19 %
MAGNESIUM SERPL-MCNC: 2.2 MG/DL (ref 1.7–2.4)
MCH RBC QN AUTO: 29.1 PG (ref 26–34)
MCHC RBC AUTO-ENTMCNC: 31.3 G/DL (ref 32–36.5)
MCV RBC AUTO: 92.7 FL (ref 78–100)
MONOCYTES # BLD: 0.9 K/MCL (ref 0.3–0.9)
MONOCYTES NFR BLD: 8 %
NEUTROPHILS # BLD: 7.5 K/MCL (ref 1.8–7.7)
NEUTROPHILS NFR BLD: 73 %
NRBC BLD MANUAL-RTO: 0 /100 WBC
PLATELET # BLD AUTO: 181 K/MCL (ref 140–450)
POTASSIUM SERPL-SCNC: 3.6 MMOL/L (ref 3.4–5.1)
RBC # BLD: 3.58 MIL/MCL (ref 4.5–5.9)
SODIUM SERPL-SCNC: 141 MMOL/L (ref 135–145)
WBC # BLD: 10.4 K/MCL (ref 4.2–11)

## 2024-03-06 PROCEDURE — 10002800 HB RX 250 W HCPCS

## 2024-03-06 PROCEDURE — 85025 COMPLETE CBC W/AUTO DIFF WBC: CPT

## 2024-03-06 PROCEDURE — 10002803 HB RX 637

## 2024-03-06 PROCEDURE — 10004180 HB COUNTER-TRANSPORT

## 2024-03-06 PROCEDURE — 96372 THER/PROPH/DIAG INJ SC/IM: CPT

## 2024-03-06 PROCEDURE — 96372 THER/PROPH/DIAG INJ SC/IM: CPT | Performed by: STUDENT IN AN ORGANIZED HEALTH CARE EDUCATION/TRAINING PROGRAM

## 2024-03-06 PROCEDURE — 10002800 HB RX 250 W HCPCS: Performed by: STUDENT IN AN ORGANIZED HEALTH CARE EDUCATION/TRAINING PROGRAM

## 2024-03-06 PROCEDURE — 80048 BASIC METABOLIC PNL TOTAL CA: CPT

## 2024-03-06 PROCEDURE — 83735 ASSAY OF MAGNESIUM: CPT

## 2024-03-06 PROCEDURE — 99239 HOSP IP/OBS DSCHRG MGMT >30: CPT

## 2024-03-06 PROCEDURE — 94660 CPAP INITIATION&MGMT: CPT

## 2024-03-06 PROCEDURE — 36415 COLL VENOUS BLD VENIPUNCTURE: CPT

## 2024-03-06 PROCEDURE — X1094 NO CHARGE VISIT: HCPCS

## 2024-03-06 RX ORDER — POTASSIUM CHLORIDE 20 MEQ/1
40 TABLET, EXTENDED RELEASE ORAL ONCE
Status: COMPLETED | OUTPATIENT
Start: 2024-03-06 | End: 2024-03-06

## 2024-03-06 RX ADMIN — POTASSIUM CHLORIDE 40 MEQ: 1500 TABLET, EXTENDED RELEASE ORAL at 09:15

## 2024-03-06 RX ADMIN — ENOXAPARIN SODIUM 40 MG: 100 INJECTION SUBCUTANEOUS at 09:09

## 2024-03-06 RX ADMIN — HYDROCODONE BITARTRATE AND ACETAMINOPHEN 1 TABLET: 5; 325 TABLET ORAL at 05:22

## 2024-03-06 RX ADMIN — INSULIN LISPRO 2 UNITS: 100 INJECTION, SOLUTION INTRAVENOUS; SUBCUTANEOUS at 12:35

## 2024-03-06 RX ADMIN — MUPIROCIN: 20 OINTMENT TOPICAL at 09:12

## 2024-03-06 RX ADMIN — HYDROCODONE BITARTRATE AND ACETAMINOPHEN 1 TABLET: 5; 325 TABLET ORAL at 12:35

## 2024-03-06 RX ADMIN — PANTOPRAZOLE 40 MG: 40 TABLET, DELAYED RELEASE ORAL at 05:22

## 2024-03-06 RX ADMIN — FERROUS SULFATE TAB 325 MG (65 MG ELEMENTAL FE) 325 MG: 325 (65 FE) TAB at 09:14

## 2024-03-06 RX ADMIN — FUROSEMIDE 40 MG: 40 TABLET ORAL at 09:14

## 2024-03-06 RX ADMIN — PREDNISONE 60 MG: 10 TABLET ORAL at 09:15

## 2024-03-06 ASSESSMENT — PAIN SCALES - GENERAL
PAINLEVEL_OUTOF10: 4
PAINLEVEL_OUTOF10: 0
PAINLEVEL_OUTOF10: 0

## 2024-03-08 ENCOUNTER — TELEPHONE (OUTPATIENT)
Dept: CARE COORDINATION | Age: 79
End: 2024-03-08

## 2024-03-08 LAB
BACTERIA BLD CULT: NORMAL
BACTERIA BLD CULT: NORMAL

## 2024-03-09 ENCOUNTER — TELEPHONE (OUTPATIENT)
Dept: CARE COORDINATION | Age: 79
End: 2024-03-09

## (undated) DEVICE — FAN SPRAY KIT: Brand: PULSAVAC®

## (undated) DEVICE — ZIMMER® STERILE DISPOSABLE TOURNIQUET CUFF WITH PLC, DUAL PORT, SINGLE BLADDER, 24 IN. (61 CM)

## (undated) DEVICE — KENDALL SCD EXPRESS SLEEVES, KNEE LENGTH, MEDIUM: Brand: KENDALL SCD

## (undated) DEVICE — GAMMEX® PI HYBRID SIZE 7.5, STERILE POWDER-FREE SURGICAL GLOVE, POLYISOPRENE AND NEOPRENE BLEND: Brand: GAMMEX

## (undated) DEVICE — CASED DISP BIPOLAR CORD

## (undated) DEVICE — GLOVE SURG SENSICARE SZ 7-1/2

## (undated) DEVICE — SUTURE VICRYL 0 CP-1

## (undated) DEVICE — SOL  .9 3000ML

## (undated) DEVICE — Device

## (undated) DEVICE — BIOMET BC R 1X40 US: Type: IMPLANTABLE DEVICE | Site: SHOULDER | Status: NON-FUNCTIONAL

## (undated) DEVICE — SUTURE VICRYL 2-0 FS-1

## (undated) DEVICE — KIT DRN 1/8IN PVC 3 SPRG EVAC

## (undated) DEVICE — CYSTO CDS-LF: Brand: MEDLINE INDUSTRIES, INC.

## (undated) DEVICE — VIOLET BRAIDED (POLYGLACTIN 910), SYNTHETIC ABSORBABLE SUTURE: Brand: COATED VICRYL

## (undated) DEVICE — GAMMEX® NON-LATEX PI ORTHO SIZE 7, STERILE POLYISOPRENE POWDER-FREE SURGICAL GLOVE: Brand: GAMMEX

## (undated) DEVICE — ELECTRODE ESURG 2.75IN EZ CLN

## (undated) DEVICE — DRAPE SRG 70X60IN SPLT U IMPRV

## (undated) DEVICE — SUTURE MONOCRYL 4-0 Y845G

## (undated) DEVICE — UPPER EXTREMITY: Brand: MEDLINE INDUSTRIES, INC.

## (undated) DEVICE — SUTURE FIBERWIRE S AR-7200

## (undated) DEVICE — PERIPHERAL SCREW DRILL BIT 3.2MM

## (undated) DEVICE — Device: Brand: STABLECUT®

## (undated) DEVICE — SHOULDER: Brand: MEDLINE INDUSTRIES, INC.

## (undated) DEVICE — 1010 S-DRAPE TOWEL DRAPE 10/BX: Brand: STERI-DRAPE™

## (undated) DEVICE — 3M™ STERI-STRIP™ COMPOUND BENZOIN TINCTURE 40 BAGS/CARTON 4 CARTONS/CASE C1544: Brand: 3M™ STERI-STRIP™

## (undated) DEVICE — 3M™ COBAN™ NL STERILE NON-LATEX SELF-ADHERENT WRAP, 2084S, 4 IN X 5 YD (10 CM X 4,5 M), 18 ROLLS/CASE: Brand: 3M™ COBAN™

## (undated) DEVICE — CHLORAPREP 26ML APPLICATOR

## (undated) DEVICE — GOWN SURG AERO BLUE PERF LG

## (undated) DEVICE — BANDAGE ROLL,100% COTTON, 6 PLY, LARGE: Brand: KERLIX

## (undated) DEVICE — 3M™ MEDITPORE™ SOFT CLOTH TAPE 6 IN X 10 YD 12 ROLLS/CASE 2966: Brand: 3M™ MEDIPORE™

## (undated) DEVICE — Device: Brand: INJETAK ADJUSTABLE TIP NEEDLE 35CM

## (undated) DEVICE — SUTURE MONOCRYL 3-0 Y497G

## (undated) DEVICE — GAMMEX® PI HYBRID SIZE 6.5, STERILE POWDER-FREE SURGICAL GLOVE, POLYISOPRENE AND NEOPRENE BLEND: Brand: GAMMEX

## (undated) DEVICE — 3M™ STERI-STRIP™ BLEND TONE SKIN CLOSURES, B1557, TAN, 1/2 IN X 4 IN (12MM X 100MM), 6 STRIPS/ENVELOPE: Brand: 3M™ STERI-STRIP™

## (undated) DEVICE — SPONGE LAP 18X18 XRAY STRL

## (undated) DEVICE — SOL  .9 1000ML BTL

## (undated) DEVICE — SUTURE ETHIBOND 1 CT-1

## (undated) DEVICE — COTTON UNDERCAST PADDING,REGULAR FINISH: Brand: WEBRIL

## (undated) DEVICE — SOL H2O 3000ML IRRIG

## (undated) DEVICE — PLASTC TOOMEY SYRNG DISP

## (undated) DEVICE — ENCORE® LATEX MICRO SIZE 7, STERILE LATEX POWDER-FREE SURGICAL GLOVE: Brand: ENCORE

## (undated) DEVICE — SPECIALTY ARM SLING: Brand: DEROYAL

## (undated) DEVICE — UNDYED BRAIDED (POLYGLACTIN 910), SYNTHETIC ABSORBABLE SUTURE: Brand: COATED VICRYL

## (undated) DEVICE — 20 ML SYRINGE LUER-LOCK TIP: Brand: MONOJECT

## (undated) DEVICE — CAUTERY: TIP CLEANER XR 100/CS: Brand: MEDICAL ACTION INDUSTRIES

## (undated) DEVICE — 3M™ STERI-STRIP™ REINFORCED ADHESIVE SKIN CLOSURES, R1547, 1/2 IN X 4 IN (12 MM X 100 MM), 6 STRIPS/ENVELOPE: Brand: 3M™ STERI-STRIP™

## (undated) DEVICE — DRAPE SRG 90X60IN BCK TBL CVR

## (undated) DEVICE — SUTURE MONOCRYL 3-0 Y936H

## (undated) DEVICE — BATTERY

## (undated) DEVICE — BOWL CEMENT MIX QUICK-VAC

## (undated) DEVICE — BLADE SAW SAGITTAL 19.5

## (undated) DEVICE — SHOULDER P.A.D II: Brand: DEROYAL

## (undated) DEVICE — ALIGNEMENT PIN-SMOOTH

## (undated) DEVICE — SUTURE PROLENE 3-0 8687H

## (undated) NOTE — LETTER
[facility]  Phone: [phone]  OUTSIDE TESTING RESULT REQUEST      TO:   Dr. Marylen Palmer and Staff Today's Date: 1/3/20    FAX #: [phone]     IMPORTANT: FOR YOUR IMMEDIATE ATTENTION  Please FAX all test results listed be

## (undated) NOTE — LETTER
Last Revised 02/07/06  Obstructive Sleep Apnea Questionnaire    Clinical signs and symptoms suggesting the possibility of TAMMY    1. Predisposing physical characteristics (positive with any of the following present)  ? BMI 35kg/m²  ?  Craniofacial abnormalit pauses which are frightening to the observer, patient regularly falls asleep within minutes after being left unstimulated) in which case they should be treated as though they have severe sleep apnea.     The sleep laboratory’s assessment (none, mild, modera Point Total for B           C. Requirement for postoperative opioids.                Opioid requirement             Points   None 0    Low dose oral opiod 1    High dose oral opioids, parenteral or neuraxial opiods 3      Point Total for C        Estimation